# Patient Record
Sex: MALE | Race: WHITE | NOT HISPANIC OR LATINO | Employment: FULL TIME | URBAN - METROPOLITAN AREA
[De-identification: names, ages, dates, MRNs, and addresses within clinical notes are randomized per-mention and may not be internally consistent; named-entity substitution may affect disease eponyms.]

---

## 2024-01-18 DIAGNOSIS — M25.511 ACUTE PAIN OF RIGHT SHOULDER: Primary | ICD-10-CM

## 2024-02-19 RX ORDER — ATORVASTATIN CALCIUM 20 MG/1
TABLET, FILM COATED ORAL EVERY 24 HOURS
COMMUNITY

## 2024-02-19 RX ORDER — BUPROPION HYDROCHLORIDE 150 MG/1
150 TABLET ORAL EVERY 24 HOURS
COMMUNITY

## 2024-02-19 RX ORDER — LISINOPRIL 10 MG/1
10 TABLET ORAL EVERY 24 HOURS
COMMUNITY

## 2024-02-19 RX ORDER — OMEPRAZOLE 40 MG/1
1 CAPSULE, DELAYED RELEASE ORAL EVERY 24 HOURS
COMMUNITY

## 2024-02-20 ENCOUNTER — OFFICE VISIT (OUTPATIENT)
Dept: OBGYN CLINIC | Facility: CLINIC | Age: 54
End: 2024-02-20
Payer: COMMERCIAL

## 2024-02-20 ENCOUNTER — APPOINTMENT (OUTPATIENT)
Dept: RADIOLOGY | Facility: CLINIC | Age: 54
End: 2024-02-20
Payer: COMMERCIAL

## 2024-02-20 VITALS
HEIGHT: 68 IN | RESPIRATION RATE: 17 BRPM | DIASTOLIC BLOOD PRESSURE: 95 MMHG | BODY MASS INDEX: 32.95 KG/M2 | WEIGHT: 217.4 LBS | HEART RATE: 76 BPM | SYSTOLIC BLOOD PRESSURE: 153 MMHG

## 2024-02-20 DIAGNOSIS — M25.511 RIGHT SHOULDER PAIN, UNSPECIFIED CHRONICITY: ICD-10-CM

## 2024-02-20 DIAGNOSIS — M25.511 RIGHT SHOULDER PAIN, UNSPECIFIED CHRONICITY: Primary | ICD-10-CM

## 2024-02-20 PROCEDURE — 99203 OFFICE O/P NEW LOW 30 MIN: CPT | Performed by: STUDENT IN AN ORGANIZED HEALTH CARE EDUCATION/TRAINING PROGRAM

## 2024-02-20 PROCEDURE — 73030 X-RAY EXAM OF SHOULDER: CPT

## 2024-02-20 PROCEDURE — 73080 X-RAY EXAM OF ELBOW: CPT

## 2024-03-12 ENCOUNTER — TRANSCRIBE ORDERS (OUTPATIENT)
Dept: SCHEDULING | Age: 54
End: 2024-03-12

## 2024-03-12 DIAGNOSIS — Z96.611 PRESENCE OF RIGHT ARTIFICIAL SHOULDER JOINT: ICD-10-CM

## 2024-03-12 DIAGNOSIS — T84.84XA PAIN DUE TO INTERNAL ORTHOPEDIC PROSTHETIC DEVICES, IMPLANTS AND GRAFTS, INITIAL ENCOUNTER (CMS/HCC): Primary | ICD-10-CM

## 2024-04-04 ENCOUNTER — HOSPITAL ENCOUNTER (OUTPATIENT)
Dept: RADIOLOGY | Facility: HOSPITAL | Age: 54
Discharge: HOME | End: 2024-04-04
Attending: ORTHOPAEDIC SURGERY
Payer: COMMERCIAL

## 2024-04-04 DIAGNOSIS — T84.84XA PAIN DUE TO INTERNAL ORTHOPEDIC PROSTHETIC DEVICES, IMPLANTS AND GRAFTS, INITIAL ENCOUNTER (CMS/HCC): ICD-10-CM

## 2024-04-04 DIAGNOSIS — Z96.611 PRESENCE OF RIGHT ARTIFICIAL SHOULDER JOINT: ICD-10-CM

## 2024-04-04 PROCEDURE — 73200 CT UPPER EXTREMITY W/O DYE: CPT | Mod: RT

## 2024-07-23 ENCOUNTER — PRE-ADMISSION TESTING (OUTPATIENT)
Dept: PREADMISSION TESTING | Age: 54
End: 2024-07-23
Payer: COMMERCIAL

## 2024-07-23 VITALS — HEIGHT: 68 IN | WEIGHT: 215 LBS | BODY MASS INDEX: 32.58 KG/M2

## 2024-07-23 RX ORDER — IBUPROFEN 400 MG/1
400 TABLET ORAL EVERY 6 HOURS PRN
COMMUNITY
End: 2024-08-01 | Stop reason: HOSPADM

## 2024-07-23 RX ORDER — LISINOPRIL 10 MG/1
10 TABLET ORAL DAILY
COMMUNITY

## 2024-07-23 RX ORDER — ATORVASTATIN CALCIUM 20 MG/1
20 TABLET, FILM COATED ORAL DAILY
COMMUNITY

## 2024-07-23 RX ORDER — BUPROPION HYDROCHLORIDE 150 MG/1
150 TABLET ORAL DAILY
COMMUNITY

## 2024-07-23 RX ORDER — OMEPRAZOLE 40 MG/1
40 CAPSULE, DELAYED RELEASE ORAL DAILY
COMMUNITY

## 2024-07-23 ASSESSMENT — PAIN SCALES - GENERAL: PAINLEVEL: 3

## 2024-07-23 NOTE — PRE-PROCEDURE INSTRUCTIONS
[facility]  [address] [place] [address]    1.       Admissions will call you with your arrival time on  July 30, 2024 (day prior to surgery) between 2pm-4pm.  For questions about your arrival time, please call [phone].    2.       On the day of your procedure please report to the [facility] in the [place]. Please arrive through the [facility] Entrance.  If you are parking in the [address] [facility], come to the ground floor of the garage and follow signs to the [facility].  After being screened, please report to either the Outpatient Registration for Pre-Admission Testing or to the Surgery Registration Desk.    3. Please follow the following fasting guidelines:     No solid food EIGHT HOURS prior to arrival time on day of surgery.  6 ounces of clear liquids, meaning water or PLAIN black coffee WITHOUT any milk, cream, sugar, or sweetener are permitted up to TWO HOURS prior to arrival at the hospital.    4. Please take ONLY the following medications with a sip of water on the morning of your procedure: (populate names and/or NONE)  Lipitor, Wellbutrin, Prilosec.    No NSAIDS, Aspirin, Advil, Aleve, Motrin, Ibuprofen, Herbal Supplements or Vitamins until after surgery. Tylenol is ok to take.        5. Other Instructions: You may brush your teeth the morning of the procedure. Rinse and spit, do not swallow.  Bring a list of your medications with dosages.  Use surgical wash as directed.   [facility]  Patient Education Preoperative Showers    Good hygiene, such as frequent handwashing and daily skin cleansing, promotes good health. Daily skin cleansing helps remove germs that may cause infections. The following instructions should be followed to help reduce germs on your skin prior to your surgical procedure.     Bactoshield/Hibiclens CHG 4% is an antiseptic soap. The active ingredient is chlorhexidine gluconate. Do not use this product if you are allergic to  chlorhexidine gluconate.  The NIGHT before and the MORNING of your procedure, shower or bathe with Bactoshield. This product should replace your regular soap used for cleansing most of your body surfaces. Bactoshield should not be used on your head or face; keep out of your eyes, ears and mouth.  If you plan to wash your hair, do so with your regular shampoo. Then, rinse the hair and your body thoroughly to remove any shampoo residue.  Wash your face with regular soap and water only.  Wash your genital area with soap and water only.  Thoroughly rinse your body with warm water from the neck down.  Apply the minimum amount of Bactoshield to cover the skin. Use this product as you would any liquid soap. Leave this on for 2 minutes. NOTE- Bactoshield DOES NOT LATHER like normal soap.   Wash the skin gently and rinse thoroughly with warm water. You do not need to scrub the skin to remove germs.  Do not use regular soap after you have applied and rinsed the Bactoshield.  Change into clean clothes after each shower.   Do not apply any lotions, powders, or perfumes to the body areas that have been cleansed with Bactoshield.  No use of hair removal products or shaving at or near the surgical site 48 hours before your procedure. (72 hours for cardiac patients.)  For those having perineal surgeries (i.e.: vaginal, rectal or cystoscopy) - please use Dial soap.  6. If you develop a cold, cough, fever, rash, or other symptom prior to the day of the procedure, please report it to your physician immediately.    7. If you need to cancel the procedure for any reason, please contact your physician.    8. Make arrangements to have safe transportation home accompanied by a responsible adult. If you have not arranged safe transportation home, your surgery will be cancelled. Safe transportation may include private vehicle, ride-share service, taxi and public transportation when accompanied by a responsible adult who will assist you home.  A responsible adult is someone known to you and does not include the taxi, ride-share or public transit drive transporting you.    9.  If it is medically necessary for you to have a longer stay, you will be informed as soon as the decision is made.    10. Only bring essential items to the hospital.  Do not wear or bring anything of value to the hospital including jewelry of any kind, money, or wallet. Do not wear make-up or contact lenses.  DO NOT BRING MEDICATIONS FROM HOME unless instructed to do so. DO bring your hearing aids, glasses, and a case    11. No lotion, creams, powders, or oils on skin the morning of procedure     12. Dress in comfortable clothes.    13.  If instructed, please bring a copy of your Advanced Directive (Living Will/Durable Power of ) on the day of your procedure.     14. Ensuring your safety at all times is a very important part of our [facility] Culture of Safety. After having surgery and sedation, you are at risk for falling and balance issues. Although you may feel awake, the effects of the medication can last up to 24 hours after anesthesia. If you need to use the bathroom during your recovery period, nursing staff will escort you there and stay with you to ensure your safety.    15. Refrain from drinking alcohol and smoking cigarettes for 24 hours prior to surgery.    16. Shower with antibacterial soap (Dial) the night before and morning of your procedure.  If your procedure indicates the need for CHG antiseptic wash (Bactoshield or Hibiclens), please use this instead and follow instructions as discussed at the time of your Pre-Admission Testing phone interview or visit.    Above instructions reviewed with patient and patient acknowledges understanding.    Form explained by: Suzi Alvarez RN

## 2024-07-25 ENCOUNTER — TRANSCRIBE ORDERS (OUTPATIENT)
Dept: PREADMISSION TESTING | Facility: HOSPITAL | Age: 54
End: 2024-07-25

## 2024-07-25 DIAGNOSIS — T84.84XD PAIN DUE TO INTERNAL ORTHOPEDIC PROSTHETIC DEVICES, IMPLANTS AND GRAFTS, SUBSEQUENT ENCOUNTER: Primary | ICD-10-CM

## 2024-07-26 ENCOUNTER — HOSPITAL ENCOUNTER (OUTPATIENT)
Dept: CARDIOLOGY | Facility: HOSPITAL | Age: 54
Discharge: HOME | End: 2024-07-26
Attending: ORTHOPAEDIC SURGERY
Payer: COMMERCIAL

## 2024-07-26 ENCOUNTER — PRE-ADMISSION TESTING (OUTPATIENT)
Dept: PREADMISSION TESTING | Facility: HOSPITAL | Age: 54
End: 2024-07-26
Attending: ORTHOPAEDIC SURGERY
Payer: COMMERCIAL

## 2024-07-26 VITALS
HEART RATE: 62 BPM | HEIGHT: 68 IN | TEMPERATURE: 97 F | BODY MASS INDEX: 31.8 KG/M2 | DIASTOLIC BLOOD PRESSURE: 88 MMHG | WEIGHT: 209.8 LBS | SYSTOLIC BLOOD PRESSURE: 126 MMHG | OXYGEN SATURATION: 100 % | RESPIRATION RATE: 16 BRPM

## 2024-07-26 DIAGNOSIS — E78.9 LIPID DISORDER: ICD-10-CM

## 2024-07-26 DIAGNOSIS — T84.84XD PAIN DUE TO INTERNAL ORTHOPEDIC PROSTHETIC DEVICES, IMPLANTS AND GRAFTS, SUBSEQUENT ENCOUNTER: ICD-10-CM

## 2024-07-26 DIAGNOSIS — I10 HYPERTENSION, UNSPECIFIED TYPE: ICD-10-CM

## 2024-07-26 DIAGNOSIS — Z01.818 ENCOUNTER FOR OTHER PREPROCEDURAL EXAMINATION: Primary | ICD-10-CM

## 2024-07-26 DIAGNOSIS — F32.89 OTHER DEPRESSION: ICD-10-CM

## 2024-07-26 PROBLEM — F32.A DEPRESSION: Status: ACTIVE | Noted: 2024-07-26

## 2024-07-26 LAB
ABO + RH BLD: NORMAL
ANION GAP SERPL CALC-SCNC: 8 MEQ/L (ref 3–15)
BLD GP AB SCN SERPL QL: NEGATIVE
BLOOD BANK CMNT PATIENT-IMP: NORMAL
BUN SERPL-MCNC: 9 MG/DL (ref 7–25)
CALCIUM SERPL-MCNC: 9.9 MG/DL (ref 8.6–10.3)
CHLORIDE SERPL-SCNC: 101 MEQ/L (ref 98–107)
CO2 SERPL-SCNC: 26 MEQ/L (ref 21–31)
CREAT SERPL-MCNC: 1.2 MG/DL (ref 0.7–1.3)
CRP SERPL-MCNC: <1 MG/L
D AG BLD QL: POSITIVE
EGFRCR SERPLBLD CKD-EPI 2021: >60 ML/MIN/1.73M*2
ERYTHROCYTE [DISTWIDTH] IN BLOOD BY AUTOMATED COUNT: 12.5 % (ref 11.6–14.4)
ERYTHROCYTE [SEDIMENTATION RATE] IN BLOOD BY WESTERGREN METHOD: 6 MM/HR
GLUCOSE SERPL-MCNC: 98 MG/DL (ref 70–99)
HCT VFR BLD AUTO: 43.3 % (ref 40.1–51)
HGB BLD-MCNC: 14.8 G/DL (ref 13.7–17.5)
LABORATORY COMMENT REPORT: NORMAL
MCH RBC QN AUTO: 30 PG (ref 28–33.2)
MCHC RBC AUTO-ENTMCNC: 34.2 G/DL (ref 32.2–36.5)
MCV RBC AUTO: 87.7 FL (ref 83–98)
PDW BLD AUTO: 10.7 FL (ref 9.4–12.4)
PLATELET # BLD AUTO: 187 K/UL (ref 150–350)
POTASSIUM SERPL-SCNC: 4.5 MEQ/L (ref 3.5–5.1)
RBC # BLD AUTO: 4.94 M/UL (ref 4.5–5.8)
SODIUM SERPL-SCNC: 135 MEQ/L (ref 136–145)
SPECIMEN EXP DATE BLD: NORMAL
WBC # BLD AUTO: 4.87 K/UL (ref 3.8–10.5)

## 2024-07-26 PROCEDURE — 85652 RBC SED RATE AUTOMATED: CPT

## 2024-07-26 PROCEDURE — 99204 OFFICE O/P NEW MOD 45 MIN: CPT | Performed by: HOSPITALIST

## 2024-07-26 PROCEDURE — 80048 BASIC METABOLIC PNL TOTAL CA: CPT

## 2024-07-26 PROCEDURE — 86140 C-REACTIVE PROTEIN: CPT

## 2024-07-26 PROCEDURE — 85027 COMPLETE CBC AUTOMATED: CPT

## 2024-07-26 PROCEDURE — 86850 RBC ANTIBODY SCREEN: CPT

## 2024-07-26 PROCEDURE — 93005 ELECTROCARDIOGRAM TRACING: CPT

## 2024-07-26 PROCEDURE — 36415 COLL VENOUS BLD VENIPUNCTURE: CPT

## 2024-07-26 NOTE — H&P (VIEW-ONLY)
[facility] Medicine -  Pre-Operative Consultation       Patient Name: Ephraim Roberts  Referring Surgeon: dr. mendoza    Reason for Referral: Pre-Operative Evaluation  Surgical Procedure: Right Revision to Reverse Total Shoulder Arthroplasty   Operative Date: 7/31/24  Other Providers:      PCP: Lo Francisco CRNP        HISTORY OF PRESENT ILLNESS      Ephraim Roberts is a 54 y.o. male presenting today to the [facility] at [facility] for pre-operative evaluation prior to planned surgery.  Pt is scheduled for Right Revision to Reverse Total Shoulder Arthroplasty     The patient denies any current or recent chest pain or pressure, dyspnea, cough, sputum, fevers, chills, abdominal pain, nausea, vomiting, diarrhea, urinary complaints, dizzy, lightheaded, blood in stool or other symptoms.     Functionally, the patient is able to ascend a flight or so of stairs with no dyspnea or chest pain.     No history of MI, arrhythmia,or CHF.  No history of CARRIE.  No history of DVT/PE.  No history of COPD.  No history of CVA.  No history of DM.   No history of CKD.     PAST MEDICAL AND SURGICAL HISTORY      Past Medical History:   Diagnosis Date    Arthritis     Depression     GERD (gastroesophageal reflux disease)     Hypertension     Lipid disorder        Past Surgical History:   Procedure Laterality Date    COLONOSCOPY      KNEE ARTHROSCOPY Right 1994    SHOULDER ARTHROTOMY Right 1990    Calcium deposit on Growthplate    SHOULDER SURGERY Right 1991    SHOULDER SURGERY Right 1993    arthritis on clavicle    SHOULDER SURGERY Right 1998    open release Subscapula    SHOULDER SURGERY Right 2006    Partial shoulder replacement/ Dr Aviles    SHOULDER SURGERY Right 2007    Total Shoulder replacement/ Dr Mendoza    WISDOM TOOTH EXTRACTION      WRIST SURGERY Right 05/11/2017    wrist arthroscopy with ulner shortening osteotomy       MEDICATIONS        Current Outpatient Medications:      "atorvastatin (LIPITOR) 20 mg tablet, Take 20 mg by mouth daily., Disp: , Rfl:     buPROPion XL (WELLBUTRIN XL) 150 mg 24 hr tablet, Take 150 mg by mouth daily., Disp: , Rfl:     ibuprofen (MOTRIN) 400 mg tablet, Take 400 mg by mouth every 6 (six) hours as needed., Disp: , Rfl:     lisinopriL (PRINIVIL) 10 mg tablet, Take 10 mg by mouth daily., Disp: , Rfl:     omeprazole (PriLOSEC) 40 mg capsule, Take 40 mg by mouth daily., Disp: , Rfl:     ALLERGIES      Patient has no known allergies.    FAMILY HISTORY      family history includes Arthritis in his biological mother; Heart disease in his biological father.      SOCIAL HISTORY      Social History     Tobacco Use    Smoking status: Former     Packs/day: 1.00     Years: 30.00     Additional pack years: 0.00     Total pack years: 30.00     Types: Cigarettes     Quit date:      Years since quittin.5    Smokeless tobacco: Never   Vaping Use    Vaping Use: Every day    Substances: Nicotine    Devices: [employer] tank   Substance Use Topics    Alcohol use: Yes     Comment: socially    Drug use: Never       REVIEW OF SYSTEMS      All other systems reviewed and negative except as noted in HPI    PHYSICAL EXAMINATION      Visit Vitals  /88 (BP Location: Right upper arm, Patient Position: Sitting)   Pulse 62   Temp 36.1 °C (97 °F) (Temporal)   Resp 16   Ht 1.727 m (5' 8\")   Wt 95.2 kg (209 lb 12.8 oz)   SpO2 100%   BMI 31.90 kg/m²     Body mass index is 31.9 kg/m².    Physical Exam  Constitutional:       Appearance: He is not ill-appearing, toxic-appearing or diaphoretic.   Eyes:      General: No scleral icterus.     Extraocular Movements: Extraocular movements intact.   Cardiovascular:      Rate and Rhythm: Regular rhythm.      Heart sounds:      No friction rub. No gallop.   Pulmonary:      Effort: No respiratory distress.      Breath sounds: No stridor. No wheezing, rhonchi or rales.   Abdominal:      General: Bowel sounds are normal.      Palpations: Abdomen " is soft.   Skin:     General: Skin is warm.   Neurological:      Mental Status: He is alert and oriented to person, place, and time.   Psychiatric:         Mood and Affect: Mood normal.         Thought Content: Thought content normal.         Judgment: Judgment normal.         LABS / EKG        Labs  Results from last 7 days   Lab Units 07/26/24  0831   WBC K/uL 4.87   HEMOGLOBIN g/dL 14.8   HEMATOCRIT % 43.3   PLATELETS K/uL 187     Results from last 7 days   Lab Units 07/26/24  0831   SODIUM mEQ/L 135*   POTASSIUM mEQ/L 4.5   CHLORIDE mEQ/L 101   CO2 mEQ/L 26   BUN mg/dL 9   CREATININE mg/dL 1.2   CALCIUM mg/dL 9.9   GLUCOSE mg/dL 98       IMAGING  No results found.    EKG   EKG independently reviewed - sr w hr of 63. No st elevations.     ASSESSMENT AND PLAN         Encounter for other preprocedural examination  Pt sched for Right Revision to Reverse Total Shoulder Arthroplasty   Pt has no cardiac symptoms and ekg is nonischemic. Pt exhibits no signs/symptoms of angina, arrythmia or decompenstated CHF.   Pt's labs are at an acceptable level to proceed w surgery  Pt is an intermediate but acceptable risk for intended procedure.       Lipid disorder  Cont w statin.     Depression  Cont w wellbutrin    HTN (hypertension)  Hold lisinopril on the morning of surgery       In regards to perioperative cardiac risk:  The patient denies any history of ischemic heart disease, denies any history of CHF, denies any history of CVA, is not on pre-operative treatment with insulin, and does not have a pre-operative creatinine > 2 mg/dL.   Pt's revised cardiac risk index  (RCRI) is 0.4% which equates to low risk for cardiac death, nonfatal myocardial infarction, and nonfatal cardiac arrest     Further comments:  Resume supplements when OK with surgical team.  I would encourage incentive spirometry to assist with minimizing lin-operative pulmonary risk.  DVT prophylaxis and timing of such per the discretion of the surgeon.      Please do not hesitate to contact [facility] during the upcoming hospitalization with any questions or concerns.     Juanpablo Caal DO  7/26/2024

## 2024-07-26 NOTE — ASSESSMENT & PLAN NOTE
Pt sched for Right Revision to Reverse Total Shoulder Arthroplasty   Pt has no cardiac symptoms and ekg is nonischemic. Pt exhibits no signs/symptoms of angina, arrythmia or decompenstated CHF.   Pt's labs are at an acceptable level to proceed w surgery  Pt is an intermediate but acceptable risk for intended procedure.

## 2024-07-26 NOTE — CONSULTS
[facility] Medicine -  Pre-Operative Consultation       Patient Name: Ephraim Roberts  Referring Surgeon: dr. mendoza    Reason for Referral: Pre-Operative Evaluation  Surgical Procedure: Right Revision to Reverse Total Shoulder Arthroplasty   Operative Date: 7/31/24  Other Providers:      PCP: Lo Francisco CRNP        HISTORY OF PRESENT ILLNESS      Ephraim Roberts is a 54 y.o. male presenting today to the [facility] at [facility] for pre-operative evaluation prior to planned surgery.  Pt is scheduled for Right Revision to Reverse Total Shoulder Arthroplasty     The patient denies any current or recent chest pain or pressure, dyspnea, cough, sputum, fevers, chills, abdominal pain, nausea, vomiting, diarrhea, urinary complaints, dizzy, lightheaded, blood in stool or other symptoms.     Functionally, the patient is able to ascend a flight or so of stairs with no dyspnea or chest pain.     No history of MI, arrhythmia,or CHF.  No history of CARRIE.  No history of DVT/PE.  No history of COPD.  No history of CVA.  No history of DM.   No history of CKD.     PAST MEDICAL AND SURGICAL HISTORY      Past Medical History:   Diagnosis Date    Arthritis     Depression     GERD (gastroesophageal reflux disease)     Hypertension     Lipid disorder        Past Surgical History:   Procedure Laterality Date    COLONOSCOPY      KNEE ARTHROSCOPY Right 1994    SHOULDER ARTHROTOMY Right 1990    Calcium deposit on Growthplate    SHOULDER SURGERY Right 1991    SHOULDER SURGERY Right 1993    arthritis on clavicle    SHOULDER SURGERY Right 1998    open release Subscapula    SHOULDER SURGERY Right 2006    Partial shoulder replacement/ Dr Aviles    SHOULDER SURGERY Right 2007    Total Shoulder replacement/ Dr Mendoza    WISDOM TOOTH EXTRACTION      WRIST SURGERY Right 05/11/2017    wrist arthroscopy with ulner shortening osteotomy       MEDICATIONS        Current Outpatient Medications:      "atorvastatin (LIPITOR) 20 mg tablet, Take 20 mg by mouth daily., Disp: , Rfl:     buPROPion XL (WELLBUTRIN XL) 150 mg 24 hr tablet, Take 150 mg by mouth daily., Disp: , Rfl:     ibuprofen (MOTRIN) 400 mg tablet, Take 400 mg by mouth every 6 (six) hours as needed., Disp: , Rfl:     lisinopriL (PRINIVIL) 10 mg tablet, Take 10 mg by mouth daily., Disp: , Rfl:     omeprazole (PriLOSEC) 40 mg capsule, Take 40 mg by mouth daily., Disp: , Rfl:     ALLERGIES      Patient has no known allergies.    FAMILY HISTORY      family history includes Arthritis in his biological mother; Heart disease in his biological father.      SOCIAL HISTORY      Social History     Tobacco Use    Smoking status: Former     Packs/day: 1.00     Years: 30.00     Additional pack years: 0.00     Total pack years: 30.00     Types: Cigarettes     Quit date:      Years since quittin.5    Smokeless tobacco: Never   Vaping Use    Vaping Use: Every day    Substances: Nicotine    Devices: [employer] tank   Substance Use Topics    Alcohol use: Yes     Comment: socially    Drug use: Never       REVIEW OF SYSTEMS      All other systems reviewed and negative except as noted in HPI    PHYSICAL EXAMINATION      Visit Vitals  /88 (BP Location: Right upper arm, Patient Position: Sitting)   Pulse 62   Temp 36.1 °C (97 °F) (Temporal)   Resp 16   Ht 1.727 m (5' 8\")   Wt 95.2 kg (209 lb 12.8 oz)   SpO2 100%   BMI 31.90 kg/m²     Body mass index is 31.9 kg/m².    Physical Exam  Constitutional:       Appearance: He is not ill-appearing, toxic-appearing or diaphoretic.   Eyes:      General: No scleral icterus.     Extraocular Movements: Extraocular movements intact.   Cardiovascular:      Rate and Rhythm: Regular rhythm.      Heart sounds:      No friction rub. No gallop.   Pulmonary:      Effort: No respiratory distress.      Breath sounds: No stridor. No wheezing, rhonchi or rales.   Abdominal:      General: Bowel sounds are normal.      Palpations: Abdomen " is soft.   Skin:     General: Skin is warm.   Neurological:      Mental Status: He is alert and oriented to person, place, and time.   Psychiatric:         Mood and Affect: Mood normal.         Thought Content: Thought content normal.         Judgment: Judgment normal.         LABS / EKG        Labs  Results from last 7 days   Lab Units 07/26/24  0831   WBC K/uL 4.87   HEMOGLOBIN g/dL 14.8   HEMATOCRIT % 43.3   PLATELETS K/uL 187     Results from last 7 days   Lab Units 07/26/24  0831   SODIUM mEQ/L 135*   POTASSIUM mEQ/L 4.5   CHLORIDE mEQ/L 101   CO2 mEQ/L 26   BUN mg/dL 9   CREATININE mg/dL 1.2   CALCIUM mg/dL 9.9   GLUCOSE mg/dL 98       IMAGING  No results found.    EKG   EKG independently reviewed - sr w hr of 63. No st elevations.     ASSESSMENT AND PLAN         Encounter for other preprocedural examination  Pt sched for Right Revision to Reverse Total Shoulder Arthroplasty   Pt has no cardiac symptoms and ekg is nonischemic. Pt exhibits no signs/symptoms of angina, arrythmia or decompenstated CHF.   Pt's labs are at an acceptable level to proceed w surgery  Pt is an intermediate but acceptable risk for intended procedure.       Lipid disorder  Cont w statin.     Depression  Cont w wellbutrin    HTN (hypertension)  Hold lisinopril on the morning of surgery       In regards to perioperative cardiac risk:  The patient denies any history of ischemic heart disease, denies any history of CHF, denies any history of CVA, is not on pre-operative treatment with insulin, and does not have a pre-operative creatinine > 2 mg/dL.   Pt's revised cardiac risk index  (RCRI) is 0.4% which equates to low risk for cardiac death, nonfatal myocardial infarction, and nonfatal cardiac arrest     Further comments:  Resume supplements when OK with surgical team.  I would encourage incentive spirometry to assist with minimizing lin-operative pulmonary risk.  DVT prophylaxis and timing of such per the discretion of the surgeon.      Please do not hesitate to contact [facility] during the upcoming hospitalization with any questions or concerns.     Juanpablo Caal DO  7/26/2024

## 2024-07-27 LAB
ATRIAL RATE: 63
P AXIS: 25
PR INTERVAL: 158
QRS DURATION: 82
QT INTERVAL: 404
QTC CALCULATION(BAZETT): 413
R AXIS: 41
T WAVE AXIS: 40
VENTRICULAR RATE: 63

## 2024-07-29 NOTE — DISCHARGE INSTRUCTIONS
Please take Doxycycline 100mg twice daily for 2 weeks until the cultures taken in the operating room are finalized. Use caution when out in the sun as doxycycline makes you more sensitive to the sun. Use sunscreen.        Procedure name: Reverse Ball and Socket replacement - Reji     Symptoms to call for instructions  The following are CONCERNING SYMPTOMS after a reverse shoulder replacement    PLEASE CALL YOUR DOCTOR IF:                * You have excessive redness of the incision.              * You have drainage for more than 4 days.              * You have a fever greater than 101.5 degrees Fahrenheit.    Activity restrictions instructions  The following are ACTIVITY RESTRICTIONS after a reverse shoulder replacement    * A sling has been provided for you.  * After the first 48 to 72 hours, you may remove the sling to bathe and dress and perform mirian ctivities taught to you prior to discharge. Otherwise, the sling should be worn.    Wound care instructions  The following are INCISION CARE instructions after a reverse shoulder replacement           * Use ice on the shoulder intermittently over the first 48 hours after surgery                    (20 minutes on and 20 minutes off).  If you have a beige tape dressing, you may remove it after 2 days.  If you have a clear plastic dressing, it is waterproof and should not be removed  * You may shower immediately with the waterproof dressing. Otherwise after the 5th day.  The incision site can get wet after day 5 but CANNOT be submerged under water until your sutures/staples are removed.  * Do not rub the incision.  Make sure your axilla (armpit) is completely dry after showering.    Additional supplement instructions  In addition,    * Pain medication has been prescribed for you.  Take a stool softener (Colace, Dulcolax or Senakot) if you are using narcotic pain medications.  * Use your medication liberally as directed over the first 48 hours, and then begin to taper  the use of your narcotic.  You may take Extra Strength Tylenol or Tylenol in addition to the narcotic pills early in the postoperative period and in place of them while titrating off of the narcotics.  * DO NOT take ANY nonsteroidal anti-inflammatory pain medications: Advil, Motrin, Ibuprofen, Aleve, Naproxen, or Naprosyn.      * Take one 81mg aspirin once a day for 6 weeks after surgery, unless you have an aspirin sensitivity /allergy or asthma.      * Please call [phone] or [phone] for any problems.  * Please call [phone] to make a follow-up appointment.  You should see the doctor 10-14 days after your surgery.

## 2024-07-31 ENCOUNTER — ANESTHESIA EVENT (OUTPATIENT)
Dept: OPERATING ROOM | Facility: HOSPITAL | Age: 54
Setting detail: HOSPITAL OUTPATIENT SURGERY
End: 2024-07-31
Payer: COMMERCIAL

## 2024-07-31 ENCOUNTER — APPOINTMENT (OUTPATIENT)
Dept: RADIOLOGY | Facility: HOSPITAL | Age: 54
End: 2024-07-31
Attending: NURSE PRACTITIONER
Payer: COMMERCIAL

## 2024-07-31 ENCOUNTER — HOSPITAL ENCOUNTER (OUTPATIENT)
Facility: HOSPITAL | Age: 54
Discharge: HOME | End: 2024-08-01
Attending: ORTHOPAEDIC SURGERY | Admitting: ORTHOPAEDIC SURGERY
Payer: COMMERCIAL

## 2024-07-31 DIAGNOSIS — T84.84XD PAIN DUE TO INTERNAL ORTHOPEDIC PROSTHETIC DEVICES, IMPLANTS AND GRAFTS, SUBSEQUENT ENCOUNTER: ICD-10-CM

## 2024-07-31 DIAGNOSIS — Z98.890 S/P SHOULDER SURGERY: Primary | ICD-10-CM

## 2024-07-31 LAB
ABO + RH BLD: NORMAL
APPEARANCE SNV: ABNORMAL
BODY FLD TYPE: ABNORMAL
COLOR SNV: ABNORMAL
D AG BLD QL: POSITIVE
GLUCOSE BLD-MCNC: 108 MG/DL (ref 70–99)
LABORATORY COMMENT REPORT: NORMAL
LYMPHOCYTES NFR FLD MANUAL: 24 %
MONOS+MACROS NFR FLD MANUAL: 1 %
NEUTROPHILS NFR FLD MANUAL: 75 %
POCT TEST: ABNORMAL
RBC # SNV: ABNORMAL CELLS/CU MM (ref 0–10000)
SPECIMEN SOURCE: ABNORMAL
WBC # SNV AUTO: 1486 CELLS/CU MM (ref 0–200)

## 2024-07-31 PROCEDURE — 63600000 HC DRUGS/DETAIL CODE: Mod: JZ | Performed by: STUDENT IN AN ORGANIZED HEALTH CARE EDUCATION/TRAINING PROGRAM

## 2024-07-31 PROCEDURE — 25800000 HC PHARMACY IV SOLUTIONS: Performed by: NURSE ANESTHETIST, CERTIFIED REGISTERED

## 2024-07-31 PROCEDURE — 63700000 HC SELF-ADMINISTRABLE DRUG: Performed by: NURSE PRACTITIONER

## 2024-07-31 PROCEDURE — 87102 FUNGUS ISOLATION CULTURE: CPT | Performed by: ORTHOPAEDIC SURGERY

## 2024-07-31 PROCEDURE — 0RRJ0JZ REPLACEMENT OF RIGHT SHOULDER JOINT WITH SYNTHETIC SUBSTITUTE, OPEN APPROACH: ICD-10-PCS | Performed by: ORTHOPAEDIC SURGERY

## 2024-07-31 PROCEDURE — 71000001 HC PACU PHASE 1 INITIAL 30MIN: Performed by: ORTHOPAEDIC SURGERY

## 2024-07-31 PROCEDURE — 87206 SMEAR FLUORESCENT/ACID STAI: CPT | Performed by: ORTHOPAEDIC SURGERY

## 2024-07-31 PROCEDURE — 89060 EXAM SYNOVIAL FLUID CRYSTALS: CPT | Performed by: ORTHOPAEDIC SURGERY

## 2024-07-31 PROCEDURE — 25800000 HC PHARMACY IV SOLUTIONS: Performed by: NURSE PRACTITIONER

## 2024-07-31 PROCEDURE — 99232 SBSQ HOSP IP/OBS MODERATE 35: CPT | Performed by: HOSPITALIST

## 2024-07-31 PROCEDURE — 36000015 HC OR LEVEL 5 EA ADDL MIN: Performed by: ORTHOPAEDIC SURGERY

## 2024-07-31 PROCEDURE — 89050 BODY FLUID CELL COUNT: CPT | Performed by: ORTHOPAEDIC SURGERY

## 2024-07-31 PROCEDURE — 93005 ELECTROCARDIOGRAM TRACING: CPT | Performed by: STUDENT IN AN ORGANIZED HEALTH CARE EDUCATION/TRAINING PROGRAM

## 2024-07-31 PROCEDURE — 0RPJ0JZ REMOVAL OF SYNTHETIC SUBSTITUTE FROM RIGHT SHOULDER JOINT, OPEN APPROACH: ICD-10-PCS | Performed by: ORTHOPAEDIC SURGERY

## 2024-07-31 PROCEDURE — 63600000 HC DRUGS/DETAIL CODE: Performed by: NURSE ANESTHETIST, CERTIFIED REGISTERED

## 2024-07-31 PROCEDURE — 37000001 HC ANESTHESIA GENERAL: Performed by: ORTHOPAEDIC SURGERY

## 2024-07-31 PROCEDURE — 63600000 HC DRUGS/DETAIL CODE: Mod: JZ | Performed by: NURSE PRACTITIONER

## 2024-07-31 PROCEDURE — 36000005 HC OR LEVEL 5 INITIAL 30MIN: Performed by: ORTHOPAEDIC SURGERY

## 2024-07-31 PROCEDURE — 87076 CULTURE ANAEROBE IDENT EACH: CPT | Performed by: ORTHOPAEDIC SURGERY

## 2024-07-31 PROCEDURE — 71000011 HC PACU PHASE 1 EA ADDL MIN: Performed by: ORTHOPAEDIC SURGERY

## 2024-07-31 PROCEDURE — C1769 GUIDE WIRE: HCPCS | Performed by: ORTHOPAEDIC SURGERY

## 2024-07-31 PROCEDURE — 63600000 HC DRUGS/DETAIL CODE: Mod: JZ | Performed by: ORTHOPAEDIC SURGERY

## 2024-07-31 PROCEDURE — 27200000 HC STERILE SUPPLY: Performed by: ORTHOPAEDIC SURGERY

## 2024-07-31 PROCEDURE — 25000000 HC PHARMACY GENERAL: Performed by: NURSE ANESTHETIST, CERTIFIED REGISTERED

## 2024-07-31 PROCEDURE — 63600000 HC DRUGS/DETAIL CODE: Mod: JZ,JG | Performed by: NURSE ANESTHETIST, CERTIFIED REGISTERED

## 2024-07-31 PROCEDURE — 73020 X-RAY EXAM OF SHOULDER: CPT | Mod: RT

## 2024-07-31 PROCEDURE — 87205 SMEAR GRAM STAIN: CPT | Performed by: ORTHOPAEDIC SURGERY

## 2024-07-31 PROCEDURE — 25000000 HC PHARMACY GENERAL: Performed by: ORTHOPAEDIC SURGERY

## 2024-07-31 PROCEDURE — 36415 COLL VENOUS BLD VENIPUNCTURE: CPT | Performed by: ORTHOPAEDIC SURGERY

## 2024-07-31 PROCEDURE — C1713 ANCHOR/SCREW BN/BN,TIS/BN: HCPCS | Performed by: ORTHOPAEDIC SURGERY

## 2024-07-31 PROCEDURE — C1776 JOINT DEVICE (IMPLANTABLE): HCPCS | Performed by: ORTHOPAEDIC SURGERY

## 2024-07-31 DEVICE — AEQUALIS PERFORM REVERSED AUGMENT BASEPLATE: Type: IMPLANTABLE DEVICE | Site: SHOULDER | Status: FUNCTIONAL

## 2024-07-31 DEVICE — CEMENT BONE SIMPLEX W/TOBRAMYCIN: Type: IMPLANTABLE DEVICE | Site: SHOULDER | Status: FUNCTIONAL

## 2024-07-31 DEVICE — PERFORM REVERSED CENTRAL SCREW 6.5MM X 25MM: Type: IMPLANTABLE DEVICE | Site: SHOULDER | Status: FUNCTIONAL

## 2024-07-31 DEVICE — HUMERAL STEM 12MM X108 REVERSE: Type: IMPLANTABLE DEVICE | Site: SHOULDER | Status: FUNCTIONAL

## 2024-07-31 DEVICE — HUMERAL SOCKET INSERT SZ 36MM STD RSP: Type: IMPLANTABLE DEVICE | Site: SHOULDER | Status: FUNCTIONAL

## 2024-07-31 DEVICE — PERFORM REVERSED PERIPHERAL SCREW 5.0MM X 26MM: Type: IMPLANTABLE DEVICE | Site: SHOULDER | Status: FUNCTIONAL

## 2024-07-31 DEVICE — SCREW 5.0MMX34MM: Type: IMPLANTABLE DEVICE | Site: SHOULDER | Status: FUNCTIONAL

## 2024-07-31 DEVICE — PERFORM REVERSED PERIPHERAL SCREW 22MM X 5MM: Type: IMPLANTABLE DEVICE | Site: SHOULDER | Status: FUNCTIONAL

## 2024-07-31 DEVICE — CHIPS CANCELLOUS 30CC 1-8MM: Type: IMPLANTABLE DEVICE | Site: SHOULDER | Status: FUNCTIONAL

## 2024-07-31 DEVICE — RESTRICTOR CEMENT 12MM: Type: IMPLANTABLE DEVICE | Site: SHOULDER | Status: FUNCTIONAL

## 2024-07-31 DEVICE — IMPLANTABLE DEVICE: Type: IMPLANTABLE DEVICE | Site: SHOULDER | Status: FUNCTIONAL

## 2024-07-31 RX ORDER — HYDROMORPHONE HYDROCHLORIDE 1 MG/ML
0.5 INJECTION, SOLUTION INTRAMUSCULAR; INTRAVENOUS; SUBCUTANEOUS
Status: DISCONTINUED | OUTPATIENT
Start: 2024-07-31 | End: 2024-07-31 | Stop reason: HOSPADM

## 2024-07-31 RX ORDER — ATORVASTATIN CALCIUM 20 MG/1
20 TABLET, FILM COATED ORAL DAILY
Status: DISCONTINUED | OUTPATIENT
Start: 2024-08-01 | End: 2024-08-01 | Stop reason: HOSPADM

## 2024-07-31 RX ORDER — POLYETHYLENE GLYCOL 3350 17 G/17G
17 POWDER, FOR SOLUTION ORAL DAILY
Status: DISCONTINUED | OUTPATIENT
Start: 2024-07-31 | End: 2024-08-01 | Stop reason: HOSPADM

## 2024-07-31 RX ORDER — ROPIVACAINE HYDROCHLORIDE 5 MG/ML
INJECTION, SOLUTION EPIDURAL; INFILTRATION; PERINEURAL
Status: COMPLETED | OUTPATIENT
Start: 2024-07-31 | End: 2024-07-31

## 2024-07-31 RX ORDER — FENTANYL CITRATE 50 UG/ML
INJECTION, SOLUTION INTRAMUSCULAR; INTRAVENOUS AS NEEDED
Status: DISCONTINUED | OUTPATIENT
Start: 2024-07-31 | End: 2024-07-31 | Stop reason: SURG

## 2024-07-31 RX ORDER — DEXTROSE 50 % IN WATER (D50W) INTRAVENOUS SYRINGE
25 AS NEEDED
Status: DISCONTINUED | OUTPATIENT
Start: 2024-07-31 | End: 2024-07-31 | Stop reason: HOSPADM

## 2024-07-31 RX ORDER — DIPHENHYDRAMINE HCL 25 MG
25 CAPSULE ORAL EVERY 6 HOURS PRN
Status: DISCONTINUED | OUTPATIENT
Start: 2024-07-31 | End: 2024-08-01 | Stop reason: HOSPADM

## 2024-07-31 RX ORDER — NEOSTIGMINE METHYLSULFATE 1 MG/ML
INJECTION INTRAVENOUS AS NEEDED
Status: DISCONTINUED | OUTPATIENT
Start: 2024-07-31 | End: 2024-07-31 | Stop reason: SURG

## 2024-07-31 RX ORDER — MIDAZOLAM HYDROCHLORIDE 2 MG/2ML
INJECTION, SOLUTION INTRAMUSCULAR; INTRAVENOUS AS NEEDED
Status: DISCONTINUED | OUTPATIENT
Start: 2024-07-31 | End: 2024-07-31 | Stop reason: SURG

## 2024-07-31 RX ORDER — NAPROXEN SODIUM 220 MG/1
81 TABLET, FILM COATED ORAL DAILY
Status: DISCONTINUED | OUTPATIENT
Start: 2024-08-01 | End: 2024-08-01 | Stop reason: HOSPADM

## 2024-07-31 RX ORDER — BISACODYL 10 MG/1
10 SUPPOSITORY RECTAL DAILY PRN
Status: DISCONTINUED | OUTPATIENT
Start: 2024-07-31 | End: 2024-08-01 | Stop reason: HOSPADM

## 2024-07-31 RX ORDER — SODIUM CHLORIDE 9 MG/ML
INJECTION, SOLUTION INTRAVENOUS CONTINUOUS PRN
Status: DISCONTINUED | OUTPATIENT
Start: 2024-07-31 | End: 2024-07-31 | Stop reason: SURG

## 2024-07-31 RX ORDER — FENTANYL CITRATE 50 UG/ML
50 INJECTION, SOLUTION INTRAMUSCULAR; INTRAVENOUS EVERY 5 MIN PRN
Status: DISCONTINUED | OUTPATIENT
Start: 2024-07-31 | End: 2024-07-31 | Stop reason: HOSPADM

## 2024-07-31 RX ORDER — ONDANSETRON HYDROCHLORIDE 2 MG/ML
4 INJECTION, SOLUTION INTRAVENOUS EVERY 6 HOURS PRN
Status: DISCONTINUED | OUTPATIENT
Start: 2024-07-31 | End: 2024-08-01 | Stop reason: HOSPADM

## 2024-07-31 RX ORDER — SODIUM CHLORIDE, SODIUM GLUCONATE, SODIUM ACETATE, POTASSIUM CHLORIDE AND MAGNESIUM CHLORIDE 30; 37; 368; 526; 502 MG/100ML; MG/100ML; MG/100ML; MG/100ML; MG/100ML
INJECTION, SOLUTION INTRAVENOUS CONTINUOUS PRN
Status: DISCONTINUED | OUTPATIENT
Start: 2024-07-31 | End: 2024-07-31 | Stop reason: SURG

## 2024-07-31 RX ORDER — SODIUM CHLORIDE 9 MG/ML
INJECTION, SOLUTION INTRAVENOUS CONTINUOUS
Status: DISCONTINUED | OUTPATIENT
Start: 2024-07-31 | End: 2024-08-01 | Stop reason: HOSPADM

## 2024-07-31 RX ORDER — DOXYCYCLINE HYCLATE 100 MG
100 TABLET ORAL EVERY 12 HOURS
Status: DISCONTINUED | OUTPATIENT
Start: 2024-08-01 | End: 2024-08-01 | Stop reason: HOSPADM

## 2024-07-31 RX ORDER — ONDANSETRON HYDROCHLORIDE 2 MG/ML
4 INJECTION, SOLUTION INTRAVENOUS
Status: DISCONTINUED | OUTPATIENT
Start: 2024-07-31 | End: 2024-07-31 | Stop reason: HOSPADM

## 2024-07-31 RX ORDER — OXYCODONE HYDROCHLORIDE 5 MG/1
5-10 TABLET ORAL EVERY 4 HOURS PRN
Status: DISCONTINUED | OUTPATIENT
Start: 2024-07-31 | End: 2024-08-01 | Stop reason: HOSPADM

## 2024-07-31 RX ORDER — AMOXICILLIN 250 MG
1 CAPSULE ORAL 2 TIMES DAILY
Status: DISCONTINUED | OUTPATIENT
Start: 2024-07-31 | End: 2024-08-01 | Stop reason: HOSPADM

## 2024-07-31 RX ORDER — CEFAZOLIN SODIUM 2 G/100ML
INJECTION, SOLUTION INTRAVENOUS AS NEEDED
Status: DISCONTINUED | OUTPATIENT
Start: 2024-07-31 | End: 2024-07-31 | Stop reason: SURG

## 2024-07-31 RX ORDER — IBUPROFEN 200 MG
16-32 TABLET ORAL AS NEEDED
Status: DISCONTINUED | OUTPATIENT
Start: 2024-07-31 | End: 2024-07-31 | Stop reason: HOSPADM

## 2024-07-31 RX ORDER — DEXTROSE 40 %
15-30 GEL (GRAM) ORAL AS NEEDED
Status: DISCONTINUED | OUTPATIENT
Start: 2024-07-31 | End: 2024-08-01 | Stop reason: HOSPADM

## 2024-07-31 RX ORDER — ALUMINUM HYDROXIDE, MAGNESIUM HYDROXIDE, AND SIMETHICONE 1200; 120; 1200 MG/30ML; MG/30ML; MG/30ML
30 SUSPENSION ORAL EVERY 4 HOURS PRN
Status: DISCONTINUED | OUTPATIENT
Start: 2024-07-31 | End: 2024-08-01 | Stop reason: HOSPADM

## 2024-07-31 RX ORDER — BUPROPION HYDROCHLORIDE 150 MG/1
150 TABLET ORAL DAILY
Status: DISCONTINUED | OUTPATIENT
Start: 2024-08-01 | End: 2024-08-01 | Stop reason: HOSPADM

## 2024-07-31 RX ORDER — PROPOFOL 10 MG/ML
INJECTION, EMULSION INTRAVENOUS AS NEEDED
Status: DISCONTINUED | OUTPATIENT
Start: 2024-07-31 | End: 2024-07-31 | Stop reason: SURG

## 2024-07-31 RX ORDER — ONDANSETRON HYDROCHLORIDE 2 MG/ML
INJECTION, SOLUTION INTRAVENOUS AS NEEDED
Status: DISCONTINUED | OUTPATIENT
Start: 2024-07-31 | End: 2024-07-31 | Stop reason: SURG

## 2024-07-31 RX ORDER — DEXAMETHASONE SODIUM PHOSPHATE 4 MG/ML
INJECTION, SOLUTION INTRA-ARTICULAR; INTRALESIONAL; INTRAMUSCULAR; INTRAVENOUS; SOFT TISSUE
Status: COMPLETED | OUTPATIENT
Start: 2024-07-31 | End: 2024-07-31

## 2024-07-31 RX ORDER — VANCOMYCIN HYDROCHLORIDE 500 MG/10ML
INJECTION, POWDER, LYOPHILIZED, FOR SOLUTION INTRAVENOUS
Status: DISCONTINUED | OUTPATIENT
Start: 2024-07-31 | End: 2024-07-31 | Stop reason: HOSPADM

## 2024-07-31 RX ORDER — LISINOPRIL 10 MG/1
10 TABLET ORAL DAILY
Status: DISCONTINUED | OUTPATIENT
Start: 2024-08-01 | End: 2024-08-01 | Stop reason: HOSPADM

## 2024-07-31 RX ORDER — IBUPROFEN 200 MG
16-32 TABLET ORAL AS NEEDED
Status: DISCONTINUED | OUTPATIENT
Start: 2024-07-31 | End: 2024-08-01 | Stop reason: HOSPADM

## 2024-07-31 RX ORDER — DEXAMETHASONE SODIUM PHOSPHATE 4 MG/ML
INJECTION, SOLUTION INTRA-ARTICULAR; INTRALESIONAL; INTRAMUSCULAR; INTRAVENOUS; SOFT TISSUE AS NEEDED
Status: DISCONTINUED | OUTPATIENT
Start: 2024-07-31 | End: 2024-07-31 | Stop reason: SURG

## 2024-07-31 RX ORDER — ACETAMINOPHEN 325 MG/1
650 TABLET ORAL EVERY 4 HOURS PRN
Status: DISCONTINUED | OUTPATIENT
Start: 2024-07-31 | End: 2024-08-01 | Stop reason: HOSPADM

## 2024-07-31 RX ORDER — DEXTROSE 50 % IN WATER (D50W) INTRAVENOUS SYRINGE
25 AS NEEDED
Status: DISCONTINUED | OUTPATIENT
Start: 2024-07-31 | End: 2024-08-01 | Stop reason: HOSPADM

## 2024-07-31 RX ORDER — ROCURONIUM BROMIDE 10 MG/ML
INJECTION, SOLUTION INTRAVENOUS AS NEEDED
Status: DISCONTINUED | OUTPATIENT
Start: 2024-07-31 | End: 2024-07-31 | Stop reason: SURG

## 2024-07-31 RX ORDER — DEXTROSE 40 %
15-30 GEL (GRAM) ORAL AS NEEDED
Status: DISCONTINUED | OUTPATIENT
Start: 2024-07-31 | End: 2024-07-31 | Stop reason: HOSPADM

## 2024-07-31 RX ORDER — PANTOPRAZOLE SODIUM 40 MG/1
40 TABLET, DELAYED RELEASE ORAL DAILY
Status: DISCONTINUED | OUTPATIENT
Start: 2024-08-01 | End: 2024-08-01 | Stop reason: HOSPADM

## 2024-07-31 RX ORDER — GLYCOPYRROLATE 0.6MG/3ML
SYRINGE (ML) INTRAVENOUS AS NEEDED
Status: DISCONTINUED | OUTPATIENT
Start: 2024-07-31 | End: 2024-07-31 | Stop reason: SURG

## 2024-07-31 RX ORDER — LIDOCAINE HYDROCHLORIDE 10 MG/ML
INJECTION, SOLUTION INFILTRATION; PERINEURAL AS NEEDED
Status: DISCONTINUED | OUTPATIENT
Start: 2024-07-31 | End: 2024-07-31 | Stop reason: SURG

## 2024-07-31 RX ORDER — PHENYLEPHRINE HYDROCHLORIDE 10 MG/ML
INJECTION INTRAVENOUS AS NEEDED
Status: DISCONTINUED | OUTPATIENT
Start: 2024-07-31 | End: 2024-07-31 | Stop reason: SURG

## 2024-07-31 RX ADMIN — NEOSTIGMINE METHYLSULFATE 3 MG: 1 INJECTION INTRAVENOUS at 16:05

## 2024-07-31 RX ADMIN — PHENYLEPHRINE HYDROCHLORIDE 100 MCG: 10 INJECTION INTRAVENOUS at 15:01

## 2024-07-31 RX ADMIN — CEFAZOLIN SODIUM 2 G: 2 INJECTION, SOLUTION INTRAVENOUS at 14:43

## 2024-07-31 RX ADMIN — SODIUM CHLORIDE, SODIUM GLUCONATE, SODIUM ACETATE, POTASSIUM CHLORIDE AND MAGNESIUM CHLORIDE: 526; 502; 368; 37; 30 INJECTION, SOLUTION INTRAVENOUS at 15:25

## 2024-07-31 RX ADMIN — FENTANYL CITRATE 50 MCG: 50 INJECTION INTRAMUSCULAR; INTRAVENOUS at 16:10

## 2024-07-31 RX ADMIN — DOCUSATE SODIUM AND SENNOSIDES 1 TABLET: 8.6; 5 TABLET, FILM COATED ORAL at 20:34

## 2024-07-31 RX ADMIN — ROCURONIUM BROMIDE 50 MG: 10 INJECTION, SOLUTION INTRAVENOUS at 13:18

## 2024-07-31 RX ADMIN — CEFAZOLIN 2 G: 2 INJECTION, POWDER, FOR SOLUTION INTRAMUSCULAR; INTRAVENOUS at 13:30

## 2024-07-31 RX ADMIN — FENTANYL CITRATE 25 MCG: 50 INJECTION INTRAMUSCULAR; INTRAVENOUS at 13:10

## 2024-07-31 RX ADMIN — GLYCOPYRROLATE 0.4 MG: 0.2 INJECTION, SOLUTION INTRAMUSCULAR; INTRAVENOUS at 16:05

## 2024-07-31 RX ADMIN — PHENYLEPHRINE HYDROCHLORIDE 100 MCG: 10 INJECTION INTRAVENOUS at 14:41

## 2024-07-31 RX ADMIN — ROPIVACAINE HYDROCHLORIDE 30 ML: 5 INJECTION, SOLUTION EPIDURAL; INFILTRATION; PERINEURAL at 13:05

## 2024-07-31 RX ADMIN — DEXAMETHASONE SODIUM PHOSPHATE 4 MG: 4 INJECTION, SOLUTION INTRA-ARTICULAR; INTRALESIONAL; INTRAMUSCULAR; INTRAVENOUS; SOFT TISSUE at 13:05

## 2024-07-31 RX ADMIN — PHENYLEPHRINE HYDROCHLORIDE 100 MCG: 10 INJECTION INTRAVENOUS at 14:45

## 2024-07-31 RX ADMIN — PHENYLEPHRINE HYDROCHLORIDE 100 MCG: 10 INJECTION INTRAVENOUS at 15:03

## 2024-07-31 RX ADMIN — DEXAMETHASONE SODIUM PHOSPHATE 4 MG: 4 INJECTION, SOLUTION INTRA-ARTICULAR; INTRALESIONAL; INTRAMUSCULAR; INTRAVENOUS; SOFT TISSUE at 13:30

## 2024-07-31 RX ADMIN — SODIUM CHLORIDE: 9 INJECTION, SOLUTION INTRAVENOUS at 20:33

## 2024-07-31 RX ADMIN — FENTANYL CITRATE 75 MCG: 50 INJECTION INTRAMUSCULAR; INTRAVENOUS at 13:18

## 2024-07-31 RX ADMIN — FENTANYL CITRATE 25 MCG: 50 INJECTION INTRAMUSCULAR; INTRAVENOUS at 16:17

## 2024-07-31 RX ADMIN — CEFAZOLIN 2 G: 2 INJECTION, POWDER, FOR SOLUTION INTRAMUSCULAR; INTRAVENOUS at 20:40

## 2024-07-31 RX ADMIN — LIDOCAINE HYDROCHLORIDE 5 ML: 10 INJECTION, SOLUTION INFILTRATION; PERINEURAL at 13:18

## 2024-07-31 RX ADMIN — FENTANYL CITRATE 25 MCG: 50 INJECTION INTRAMUSCULAR; INTRAVENOUS at 16:12

## 2024-07-31 RX ADMIN — PHENYLEPHRINE HYDROCHLORIDE 100 MCG: 10 INJECTION INTRAVENOUS at 15:15

## 2024-07-31 RX ADMIN — ONDANSETRON 4 MG: 2 INJECTION INTRAMUSCULAR; INTRAVENOUS at 15:38

## 2024-07-31 RX ADMIN — MIDAZOLAM HYDROCHLORIDE 2 MG: 1 INJECTION, SOLUTION INTRAMUSCULAR; INTRAVENOUS at 13:10

## 2024-07-31 RX ADMIN — POLYETHYLENE GLYCOL 3350 17 G: 17 POWDER, FOR SOLUTION ORAL at 20:34

## 2024-07-31 RX ADMIN — PHENYLEPHRINE HYDROCHLORIDE 150 MCG: 10 INJECTION INTRAVENOUS at 14:17

## 2024-07-31 RX ADMIN — SODIUM CHLORIDE: 0.9 INJECTION, SOLUTION INTRAVENOUS at 13:10

## 2024-07-31 RX ADMIN — PROPOFOL 200 MG: 10 INJECTION, EMULSION INTRAVENOUS at 13:18

## 2024-07-31 ASSESSMENT — COGNITIVE AND FUNCTIONAL STATUS - GENERAL
STANDING UP FROM CHAIR USING ARMS: 4 - NONE
WALKING IN HOSPITAL ROOM: 4 - NONE
MOVING TO AND FROM BED TO CHAIR: 4 - NONE
CLIMB 3 TO 5 STEPS WITH RAILING: 3 - A LITTLE

## 2024-07-31 ASSESSMENT — ENCOUNTER SYMPTOMS: DEPRESSION: 1

## 2024-07-31 NOTE — OR SURGEON
Pre-Procedure patient identification:  I am the primary operating surgeon/proceduralist and I have reviewed the applicable pathology reports and radiology studies for this procedure. I have identified the patient and confirmed laterality is right on 07/31/24 at 11:49 AM Remington Mendoza MD  Phone Number: [phone]

## 2024-07-31 NOTE — PROGRESS NOTES
Orthopaedic Surgery Progress Note    Interval History:  Patient is resting comfortably in bed. Pain currently well controlled. Patient responds to questions appropriately and follows commands.    Vital Signs:   Vitals:    07/31/24 0954   BP: (!) 163/92   Pulse: 76   Resp: 16   Temp: 36.4 °C (97.5 °F)   SpO2: 99%       Physical Exam:   General:   No acute distress   Symmetric chest rise bilaterally with normal effort     Musculoskeletal:   Right Upper Extremity  Inspection: Surgical incisions without erythema or drainage. Appropriate tenderness around the incision.  Motor: Decreased due to peripheral nerve block  Sensory: Decreased due to peripheral nerve block  Vascular: Palpable radial pulse. Brisk capillary refill      Assessment and Plan   Ephraim Roberts is a 54 y.o. male who is status post revision right reverse shoulder arthroplasty    -- POD: Day of Surgery   -- WBS: NWB RUE  -- ROM: 140/40  -- Brace: Sling in place   -- Analgesia   -- DVT PPX: ASA   -- Abx: ancef  -- Follow up OR cultures  -- PT OT recs appreciated   -- [facility] recs appreciated    Gaetano Mckenzie MD   Orthopaedic Surgery PGY-3

## 2024-07-31 NOTE — ANESTHESIA PREPROCEDURE EVALUATION
Relevant Problems   CARDIOVASCULAR   (+) HTN (hypertension)      NEUROLOGY   (+) Depression       Anesthesia ROS/MED HX    Anesthesia History    Previous anesthetics  No history of anesthetic complications  Neuro/Psych    Depression  Cardiovascular   dyslipidemia   hypertension   ECG reviewed   Normal ECG  Hematological no anemia  GI/Hepatic   GERD       Past Surgical History:   Procedure Laterality Date    COLONOSCOPY      KNEE ARTHROSCOPY Right 1994    SHOULDER ARTHROTOMY Right 1990    Calcium deposit on Growthplate    SHOULDER SURGERY Right 1991    SHOULDER SURGERY Right 1993    arthritis on clavicle    SHOULDER SURGERY Right 1998    open release Subscapula    SHOULDER SURGERY Right 2006    Partial shoulder replacement/ Dr Aviles    SHOULDER SURGERY Right 2007    Total Shoulder replacement/ Dr Mendoza    WISDOM TOOTH EXTRACTION      WRIST SURGERY Right 05/11/2017    wrist arthroscopy with ulner shortening osteotomy       Physical Exam    Airway   Mallampati: II   TM distance: >3 FB   Neck ROM: full  Cardiovascular - normal   Rhythm: regular   Rate: normalPulmonary - normal   clear to auscultation  Dental    Teeth Problems: upper dentures and lower dentures        Anesthesia Plan    Plan: general    Technique: general endotracheal and nerve block     Lines and Monitors: PIV     Airway: direct visual laryngoscopy and oral intubation    2 ASA  Blood Products:   Use of Blood Products Discussed: No   Anesthetic plan and risks discussed with: patient  Induction:    intravenous   Postop Plan:   Pain Management: interscalene block

## 2024-07-31 NOTE — CONSULTS
[facility]     Daily Progress Note       SUBJECTIVE   Interval History: Pt seen and examined in pacu. Denies any Cp or SOB, no dizziness or lightheadedness, no N/V.     OBJECTIVE      Vital signs in last 24 hours:  Temp:  [36.3 °C (97.3 °F)-36.4 °C (97.5 °F)] 36.3 °C (97.3 °F)  Heart Rate:  [76-97] 87  Resp:  [13-16] 16  BP: (137-163)/(66-92) 147/82    Intake/Output Summary (Last 24 hours) at 7/31/2024 1714  Last data filed at 7/31/2024 1618  Gross per 24 hour   Intake 1800 ml   Output 300 ml   Net 1500 ml       PHYSICAL EXAMINATION      Physical Exam  Constitutional:       General: He is not in acute distress.     Appearance: He is not toxic-appearing.   HENT:      Head: Normocephalic and atraumatic.   Eyes:      Conjunctiva/sclera: Conjunctivae normal.   Cardiovascular:      Rate and Rhythm: Normal rate and regular rhythm.      Heart sounds: Normal heart sounds.   Pulmonary:      Effort: Pulmonary effort is normal.      Breath sounds: Normal breath sounds.   Abdominal:      General: Bowel sounds are normal. There is no distension.      Palpations: Abdomen is soft.      Tenderness: There is no abdominal tenderness.   Musculoskeletal:      Cervical back: Neck supple.      Comments: RUE in sling   Skin:     General: Skin is warm and dry.   Neurological:      Mental Status: He is alert and oriented to person, place, and time.   Psychiatric:         Mood and Affect: Mood normal.         Behavior: Behavior normal.            LINES, CATHETERS, DRAINS, AIRWAYS, AND WOUNDS   Lines, Drains, and Airways:  Wounds (agree with documentation and present on admission):  Peripheral IV (Adult) 07/31/24 Left;Posterior Hand (Active)   Number of days: 0       Surgical Incision Shoulder Right (Active)   Number of days: 0         Comments:    LABS / IMAGING / TELE      Labs  Reviewed  Results from last 7 days   Lab Units 07/26/24  0831   WBC K/uL 4.87   HEMOGLOBIN g/dL 14.8   HEMATOCRIT % 43.3   PLATELETS K/uL 187       Results from last 7 days   Lab Units 07/26/24  0831   SODIUM mEQ/L 135*   POTASSIUM mEQ/L 4.5   CHLORIDE mEQ/L 101   CO2 mEQ/L 26   BUN mg/dL 9   CREATININE mg/dL 1.2   GLUCOSE mg/dL 98   CALCIUM mg/dL 9.9          Imaging  N/a    ECG/Telemetry  NSR on tele in pacu    ASSESSMENT AND PLAN      HTN (hypertension)  Assessment & Plan  Can resume lisinopril with hold parameters  Monitor BP    Depression  Assessment & Plan  Continue Wellbutrin    Lipid disorder  Assessment & Plan  Continue statin    S/P shoulder surgery  Assessment & Plan  S/p right revision to reverse total shoulder arthroplasty  Post op joint care, PT/OT, DVT prophylaxis and pain control per orthopedics team  Bowel regimen  Encourage incentive spirometry  Monitor post op labs         VTE Assessment: Padua    VTE Prophylaxis:  Current anticoagulants:    None      Code Status: No Order      Estimated Discharge Date: 8/1/2024   Disposition Planning: per ortho team     Kristopher Snyder MD  7/31/2024

## 2024-07-31 NOTE — ANESTHESIA PROCEDURE NOTES
Peripheral Block    Patient location during procedure: pre-op  Start time: 7/31/2024 1:00 PM  End time: 7/31/2024 1:10 PM  Reason for block: at surgeon's request and post-op pain management  Staffing  Performed: anesthesiologist   Anesthesiologist: Gwyn Velásquez DO  Performed by: Gwyn Velásquez DO  Authorized by: Gwyn Velásquez DO    Preanesthetic Checklist  Completed: patient identified, surgical consent, pre-op evaluation, timeout performed, IV checked, risks and benefits discussed, monitors and equipment checked and sterile field maintained during procedure  Peripheral Block  Patient position: supine  Prep: ChloraPrep and site prepped and draped  Patient monitoring: heart rate, cardiac monitor, continuous pulse ox and blood pressure  Block type: interscalene  Laterality: right  Injection technique: single-shot      Guidance: nerve stimulator and ultrasound guided  Image captured and stored.  Image visualization comments : Ultrasound used to visualize needle placement in appropriate tissue plane.: Ultrasound used to visualize medication disbursement around appropriate nerve structures.      Needle  Needle type: Tuohy   Needle gauge: 21 G  Needle length: 3 1/8 in  Test dose: negative  Assessment  Injection assessment: negative aspiration for heme, incremental injection, no paresthesia on injection, local visualized surrounding nerve on ultrasound and transient paresthesias  Paresthesia pain: immediately resolved  Heart rate change: no  Slow fractionated injection: yes  Medications Administered -   ropivacaine PF (NAROPIN) injection 0.5 % - perineural injection   30 mL - 7/31/2024 1:05:00 PM  dexAMETHasone (DECADRON) injection 4 mg/mL - perineural injection   4 mg - 7/31/2024 1:05:00 PM

## 2024-07-31 NOTE — NURSING NOTE
"Upon arrival from PACU, pt c/o \"a bubble\" in his chest- \"feels like I have to burp.\"  Upon auscultation HR sounds irregular.  HR elevated ().  Pt denies SOB, CP.  Dr. Wilder notified.  Orders received and carried out.    "

## 2024-07-31 NOTE — ANESTHESIOLOGIST PRE-PROCEDURE ATTESTATION
Pre-Procedure Patient Identification:  I am the Primary Anesthesiologist and have identified the patient on 07/31/24 at 12:21 PM.   I have confirmed the procedure(s) will be performed by the following surgeon/proceduralist Remington Mendoza MD.

## 2024-07-31 NOTE — ASSESSMENT & PLAN NOTE
S/p Revision to R rTSA on 7/31/24  - Management as per Ortho  - Pain control, DVT ppx, WBS, PT/OT as per Ortho  - Encourage IS  - Recommend bowel regimen

## 2024-07-31 NOTE — ANESTHESIA PROCEDURE NOTES
Airway  Urgency: elective    Start Time: 7/31/2024 1:21 PM  Airway not difficult    General Information and Staff    Patient location during procedure: OR  Anesthesiologist: Gwyn Velásquez DO  Resident/CRNA: Phil Childress CRNA  Performed: resident/CRNA   Performed by: Phil Childress CRNA  Authorized by: Gwyn Velásquez DO      Indications and Patient Condition  Indications for airway management: anesthesia  Sedation level: deep  Preoxygenated: yes  Patient position: sniffing  Mask difficulty assessment: 1 - vent by mask    Final Airway Details  Final airway type: endotracheal airway      Successful airway: ETT  Cuffed: yes   Successful intubation technique: direct laryngoscopy  Endotracheal tube insertion site: oral  Blade: Shraddha  Blade size: #3  ETT size (mm): 8.0  Cormack-Lehane Classification: grade I - full view of glottis  Placement verified by: chest auscultation and capnometry   Cuff volume (mL): 6  Measured from: teeth  ETT to teeth (cm): 22  Number of attempts at approach: 1  Atraumatic airway insertion

## 2024-07-31 NOTE — OP NOTE
Operative report    Date of Procedure: 7/31/2024    Preoperative diagnosis: Painful anatomic shoulder arthroplasty right shoulder T84.84 XA, loosening of glenoid component right shoulder T84.038A, rotator cuff tear right shoulder M75.121, presence of right anatomic total shoulder arthroplasty Z96.611    Postoperative diagnosis: Same    Procedure: Revision of both components to reverse total shoulder arthroplasty 57096    Surgeon: Reji    Assistant: Jony Wahl MD    Complications: None    Anesthesia: General with block.    Estimated blood loss 300 cc.    Indications: The patient is a 54-year-old male who had a prior anatomic total shoulder arthroplasty.  He has a painful shoulder with a rotator cuff tear and probable loose glenoid component.  He is brought to the operating for above procedure.    Findings: At the time of surgery, passive external rotation was 10 degrees passive elevation was 70.  We took 6 cultures 1 fluid 5 soft tissues and then gave the patient his antibiotics.  We reflected the entire anterior soft tissue envelope off the humerus, excised the inferior capsule while protecting the axillary nerve, but could not reattach the subscapularis at the end of the procedure.  It was not repairable.  The cephalic vein was not encountered.  We filled the glenoid vault with cancellous allograft chips and then placed a full wedge Jak component with a wedge at approximately 11:00 we had excellent purchase with all the screws before engaging the plate.  We used a 36 lateralized sphere I used a lateralized sphere because I believed that the glenoid was extremely medial and just putting in a standard glenoid sphere would have left it still much more medial than I wanted it therefore we used a +3 sphere size 36.  We cemented into the same cement mantle using a DJO standard length stem with antibiotic impregnated cement.  I bone grafted above the cement between the humeral canal and the stem.  In terms of  intact posterior cuff there was infraspinatus and teres minor supraspinatus was absent.  At the completion of procedure the patient had 45 or 50 degrees of external rotation with his arm at his side and 150 or 60 of elevation.  We did not use a drain.  We did place vancomycin powder.      Description of procedure: After proper written consent was obtained the patient was brought to the operating room placed on the operating table in supine position.  After adequate anesthesia been obtained, the patient shoulder was examined under anesthesia and the above findings were noted.  The patient was then brought to the edge of the operating table the operating table was placed in a beachchair position and the patient's right arm and shoulder were prepped and draped in normal sterile fashion.  His previous skin incision was utilized and extended distally by about 2-1/2 cm.  The incision was carried sharply down the level deep fascia.  As mentioned I could not identify the cephalic vein.  Therefore, I identified the tip of the coracoid and split the deltoid and pectoralis major from the tip of the coracoid all the way down to the deltoid tuberosity.  I then both bluntly and sharply dissected the pectoralis major major free from the underlying conjoined tendon.  I then identified the coracoacromial ligament, incised under it, and placed a blunt Hohmann there.  I then completely freed up the deltoid interspace between it and the humerus as well as within the subacromial space.  I then retracted the pectoralis major medially and the deltoid laterally using a Be retractor.  I then dissected under the conjoined tendon starting superiorly to inferiorly and we are able to identify the axillary nerve which was protected throughout the procedure.  The musculocutaneous nerve was not within our surgical field.  I then retracted conjoined tendon medially and deltoid laterally.  I aspirated about 3 or 4 cc of fluid from the joint it  did not appear to be purulent.  I then took a culture of the bicipital groove and the rotator interval.  I then reflected the anterior soft tissue envelope off the humerus starting at the rotator interval and extending distally.  I simultaneously abducted abducted and externally rotated the arm by releasing inferiorly so we could deliver the humerus into the wound.  Once we did, I took a culture of the humeral membrane and remove the humeral head as well as the ball taper.  I then cleaned all the soft tissue debris from around the humeral stem.  I then remove the bone from superior of the fence and also cleaned out in the back where we would grab the stem to remove it.  I then pulled the humerus laterally with a bone hook and released the entire posterior capsule.  A Fukuda was placed through this capsulotomy and the humerus was retracted posteriorly.  I was then able to identify the axillary nerve inferiorly and therefore then excised the entire inferior capsule while protecting it.  I then released the anterior capsule completely off the glenoid.  I then took a sample of the anterior capsule and posterior capsule.  This for this made our 6 culture and therefore we gave the patient his antibiotics.  We had the glenoid exposed with a reverse double-pronged Bankart retractor anteriorly, Fukuda posteriorly, and a blunt Hohmann posteriorly superiorly.  I was able to lever the glenoid component out easily.  We then used a combination of curettes and rongeurs to remove all the soft tissue debris from the glenoid vault we actually had what I thought was a circumferentially intact glenoid vault.  There was just a tiny bit of asymmetric erosion posterior superiorly.  I therefore then palpated at the base of the coracoid and placed a guidepin for the Jak implant and what I thought was at least close to the alternate centerline.  I then measured the amount of pin that was in bone and it was about 30 mm maybe 25.  I  thought this was acceptable.  I then took the peripheral reamer and reamed to make sure that we had a good seating place for the 25 mm full wedge baseplate.  I then packed the entire vault with cancellous bone.  I then used the boss drill to remove the cancellous bone from around the guidepin.  I then remove the guidepin and overdrilled the hole with a drill and then tapped it.  We then took a 25 mm full wedge baseplate with a wedge at about 11:00, and screwed it into position we had superb purchase with excellent compression.  The implant actually sat on the subchondral bone superiorly.  We then placed the compression screw and again we had excellent purchase and went back and forth between the central screw and the compression screw until we had superb purchase.  I then placed the 3 peripheral locking screws and they all had excellent purchase before engaging the plate.  I then put a blunt Hohmann inferiorly and remove some of the excess bone from the inferior glenoid to avoid impingement.  I then passed the peripheral reamer around the baseplate.  I then redelivered the humerus into the wound and attached the broach handle onto the stem we were able to remove it without difficulty.  I then reamed distally up to a size 12.  I used the epiphyseal reamer for the standard shell to ream the epiphysis.  I then used a sponge within the canal and placed the real stem size 12 with a standard liner and 30 degrees of retroversion.  This gave us a good interference fit.  I then trialed the standard liner and I thought it fit very nicely.  Therefore I removed the component, I remove the sponge and placed a cement plug distally.  We then copiously pulse irrigated.  I then placed cement within the distal third of the prepared humerus and the cement contained antibiotics.  I then impacted the real size 12 standard length stem and 30 degrees of retroversion and I put the top of the cup just about even with the top of the  tuberosity which is where it was during trialing.  I then waited for the cement to harden.  We then filled the remaining portion of the proximal humerus with bone graft.  We then impacted a standard liner which we had previously done.  I then reduced the humerus we had excellent stability excellent tension and no impingement.  We therefore then copiously pulse irrigated.  Digital palpation was again used to verify the integrity of the axillary nerve.  We then placed vancomycin powder in the subcutaneous tissue.  We did not use a drain.  We then closed the subcutaneous tissue with interrupted Vicryl and the skin was closed with staples.  The patient was then placed in a sterile dressing and a sling.  He tolerated the procedure well.  I was personally present for the entire operation except for skin closure.

## 2024-07-31 NOTE — BRIEF OP NOTE
Right Revision to Reverse Total Shoulder Arthroplasty (R) Procedure Note    Procedure:    Right Revision to Reverse Total Shoulder Arthroplasty  CPT(R) Code:  24393 - DC CANDIDA SHOULDER ARTHRPLSTY HUMERAL&GLENOID COMPNT      Pre-op Diagnosis     * Pain due to internal orthopedic prosthetic devices, implants and grafts, subsequent encounter [T84.84XD]       Post-op Diagnosis     * Pain due to internal orthopedic prosthetic devices, implants and grafts, subsequent encounter [T84.84XD]    Surgeon(s) and Role:     * Remington Mendoza MD - Primary    Anesthesia: Choice    Staff:   Circulator: Alexandr Underwood RN  Scrub Person: Dwight Del Angel RN    Procedure Details   Right Revision TSA to RSA    Estimated Blood Loss: 300 mL    Specimens:                Order Name Source Comment Collection Info Order Time   ANAEROBIC CULTURE / SMEAR (INCLUDES AEROBIC CULTURE) Shoulder, Right Hold specimen for 14 days for C. Acnes Collected By: Remington Mendoza MD 7/31/2024  2:25 PM     Release to patient   Immediate        FUNGAL CULTURE / SMEAR Shoulder, Right Hold specimen for 14 days for C. Acnes Collected By: Remington Mendoza MD 7/31/2024  2:25 PM     Release to patient   Immediate        ANAEROBIC CULTURE / SMEAR (INCLUDES AEROBIC CULTURE) Shoulder, Right Hold specimen for 14 days for C. Acnes Collected By: Remington Mendoza MD 7/31/2024  2:27 PM     Release to patient   Immediate        FUNGAL CULTURE / SMEAR Shoulder, Right Hold specimen for 14 days for C. Acnes Collected By: Remington Mendoza MD 7/31/2024  2:27 PM     Release to patient   Immediate        ANAEROBIC CULTURE / SMEAR (INCLUDES AEROBIC CULTURE) Shoulder, Right Hold specimen for 14 days for C. Acnes Collected By: Remington Mendoza MD 7/31/2024  2:29 PM     Release to patient   Immediate        FUNGAL CULTURE / SMEAR Shoulder, Right Hold specimen for 14 days for C. Acnes Collected By: Remington Mendoza MD 7/31/2024  2:29 PM     Release to  patient   Immediate        ANAEROBIC CULTURE / SMEAR (INCLUDES AEROBIC CULTURE) Shoulder, Right Hold specimen for 14 days for C. Acnes Collected By: Remington Mendoza MD 7/31/2024  2:40 PM     Release to patient   Immediate        FUNGAL CULTURE / SMEAR Shoulder, Right Hold specimen for 14 days for C. Acnes Collected By: Remington Mendoza MD 7/31/2024  2:40 PM     Release to patient   Immediate        ANAEROBIC CULTURE / SMEAR (INCLUDES AEROBIC CULTURE) Shoulder, Right Hold specimen for 14 days for C. Acnes Collected By: Remington Mendoza MD 7/31/2024  2:41 PM     Release to patient   Immediate        FUNGAL CULTURE / SMEAR Shoulder, Right Hold specimen for 14 days for C. Acnes Collected By: Remington Mendoza MD 7/31/2024  2:41 PM     Release to patient   Immediate        REPEAT ABO/RH (TYPE CHECK) Blood, Venous  Collected By: Kyra Hansen RN 7/31/2024 10:03 AM     Release to patient   Immediate        SYNOVIAL CELL COUNT W/ CRYSTALS Shoulder, Right Hold specimen for 14 days for C. Acnes Collected By: Remington Mendoza MD 7/31/2024  2:28 PM     Release to patient   Immediate              Drains: * No LDAs found *    Implants:   Implant Name Type Inv. Item Serial No.  Lot No. LRB No. Used Action   CHIPS CANCELLOUS 30CC 1-8MM - [id number]  CHIPS CANCELLOUS 30CC 1-8MM  [facility] [id number] Right 1 Implanted   AEQUALIS PERFORM REVERSED AUGMENT BASEPLATE - [id number] - [id number]  AEQUALIS PERFORM REVERSED AUGMENT BASEPLATE [id number] [facility] TECH  Right 1 Implanted   PERFORM REVERSED CENTRAL SCREW 6.5MM X 25MM - [id number]  PERFORM REVERSED CENTRAL SCREW 6.5MM X 25MM  [facility] TECH  Right 1 Implanted   PERFORM REVERSED PERIPHERAL SCREW 5.0MM X 26MM - [id number]  PERFORM REVERSED PERIPHERAL SCREW 5.0MM X 26MM  [facility] TECH  Right 1 Implanted   SCREW [id number] - [id number]  SCREW [id number]  [facility] TECH  Right 2 Implanted   PERFORM REVERSED PERIPHERAL SCREW 22MM X 5MM  - [id number]  PERFORM REVERSED PERIPHERAL SCREW 22MM X 5MM  [employer] [facility]  Right 1 Implanted   REVERSED RAGINI COCR GLENOSPHERE 3MM - [id number] - [id number]  REVERSED RAGINI COCR GLENOSPHERE 3MM [id number] [facility]  Right 1 Implanted   RESTRICTOR CEMENT 12MM - [id number]  RESTRICTOR CEMENT 12MM  [employer] [id number] Right 1 Implanted   CEMENT BONE SIMPLEX W/TOBRAMYCIN - [id number]  CEMENT BONE SIMPLEX W/TOBRAMYCIN  [employer] [id number] Right 2 Implanted   HUMERAL STEM 12MM X108 REVERSE - [id number]  HUMERAL STEM 12MM X108 REVERSE  [employer] [id number] Right 1 Implanted   HUMERAL SOCKET INSERT SZ 36MM STD RSP - [id number]  HUMERAL SOCKET INSERT SZ 36MM STD RSP  [employer] [id number] Right 1 Implanted              Complications:  None; patient tolerated the procedure well.           Disposition: PACU - hemodynamically stable.           Condition: stable    Remington Mendoza MD  Phone Number: [phone]

## 2024-08-01 VITALS
DIASTOLIC BLOOD PRESSURE: 65 MMHG | TEMPERATURE: 97.4 F | HEART RATE: 104 BPM | OXYGEN SATURATION: 98 % | RESPIRATION RATE: 20 BRPM | SYSTOLIC BLOOD PRESSURE: 141 MMHG

## 2024-08-01 LAB
ANION GAP SERPL CALC-SCNC: 7 MEQ/L (ref 3–15)
ATRIAL RATE: 106
BUN SERPL-MCNC: 16 MG/DL (ref 7–25)
CALCIUM SERPL-MCNC: 8.8 MG/DL (ref 8.6–10.3)
CHLORIDE SERPL-SCNC: 103 MEQ/L (ref 98–107)
CO2 SERPL-SCNC: 22 MEQ/L (ref 21–31)
CREAT SERPL-MCNC: 1 MG/DL (ref 0.7–1.3)
CRYSTALS SNV MICRO: NORMAL
EGFRCR SERPLBLD CKD-EPI 2021: >60 ML/MIN/1.73M*2
ERYTHROCYTE [DISTWIDTH] IN BLOOD BY AUTOMATED COUNT: 12.4 % (ref 11.6–14.4)
FUNGUS STAIN: NORMAL
GLUCOSE SERPL-MCNC: 128 MG/DL (ref 70–99)
HCT VFR BLD AUTO: 34.3 % (ref 40.1–51)
HGB BLD-MCNC: 11.9 G/DL (ref 13.7–17.5)
MCH RBC QN AUTO: 30.5 PG (ref 28–33.2)
MCHC RBC AUTO-ENTMCNC: 34.7 G/DL (ref 32.2–36.5)
MCV RBC AUTO: 87.9 FL (ref 83–98)
P AXIS: 20
PDW BLD AUTO: 11.2 FL (ref 9.4–12.4)
PLATELET # BLD AUTO: 155 K/UL (ref 150–350)
POTASSIUM SERPL-SCNC: 4.4 MEQ/L (ref 3.5–5.1)
PR INTERVAL: 140
QRS DURATION: 80
QT INTERVAL: 350
QTC CALCULATION(BAZETT): 456
R AXIS: 30
RBC # BLD AUTO: 3.9 M/UL (ref 4.5–5.8)
SODIUM SERPL-SCNC: 132 MEQ/L (ref 136–145)
T WAVE AXIS: 44
VENTRICULAR RATE: 102
WBC # BLD AUTO: 10.67 K/UL (ref 3.8–10.5)

## 2024-08-01 PROCEDURE — 25800000 HC PHARMACY IV SOLUTIONS: Performed by: NURSE PRACTITIONER

## 2024-08-01 PROCEDURE — 36415 COLL VENOUS BLD VENIPUNCTURE: CPT | Performed by: NURSE PRACTITIONER

## 2024-08-01 PROCEDURE — 80048 BASIC METABOLIC PNL TOTAL CA: CPT | Performed by: NURSE PRACTITIONER

## 2024-08-01 PROCEDURE — 99232 SBSQ HOSP IP/OBS MODERATE 35: CPT | Performed by: HOSPITALIST

## 2024-08-01 PROCEDURE — 63700000 HC SELF-ADMINISTRABLE DRUG: Performed by: NURSE PRACTITIONER

## 2024-08-01 PROCEDURE — 85027 COMPLETE CBC AUTOMATED: CPT | Performed by: NURSE PRACTITIONER

## 2024-08-01 PROCEDURE — 97162 PT EVAL MOD COMPLEX 30 MIN: CPT | Mod: GP

## 2024-08-01 PROCEDURE — 97166 OT EVAL MOD COMPLEX 45 MIN: CPT | Mod: GO

## 2024-08-01 PROCEDURE — 63600000 HC DRUGS/DETAIL CODE: Mod: JZ | Performed by: NURSE PRACTITIONER

## 2024-08-01 PROCEDURE — 93010 ELECTROCARDIOGRAM REPORT: CPT | Performed by: INTERNAL MEDICINE

## 2024-08-01 RX ORDER — NAPROXEN SODIUM 220 MG/1
81 TABLET, FILM COATED ORAL DAILY
Start: 2024-08-01

## 2024-08-01 RX ORDER — ACETAMINOPHEN 500 MG
1000 TABLET ORAL
Start: 2024-08-01

## 2024-08-01 RX ORDER — OXYCODONE HYDROCHLORIDE 5 MG/1
5 TABLET ORAL EVERY 6 HOURS PRN
Start: 2024-08-01

## 2024-08-01 RX ORDER — DOXYCYCLINE 100 MG/1
100 CAPSULE ORAL 2 TIMES DAILY
Start: 2024-08-01 | End: 2024-08-15

## 2024-08-01 RX ADMIN — DOXYCYCLINE HYCLATE 100 MG: 100 TABLET, FILM COATED ORAL at 08:01

## 2024-08-01 RX ADMIN — ASPIRIN 81 MG CHEWABLE TABLET 81 MG: 81 TABLET CHEWABLE at 08:02

## 2024-08-01 RX ADMIN — DOCUSATE SODIUM AND SENNOSIDES 1 TABLET: 8.6; 5 TABLET, FILM COATED ORAL at 08:01

## 2024-08-01 RX ADMIN — LISINOPRIL 10 MG: 10 TABLET ORAL at 08:02

## 2024-08-01 RX ADMIN — ATORVASTATIN CALCIUM 20 MG: 20 TABLET, FILM COATED ORAL at 08:01

## 2024-08-01 RX ADMIN — OXYCODONE HYDROCHLORIDE 10 MG: 5 TABLET ORAL at 09:22

## 2024-08-01 RX ADMIN — ACETAMINOPHEN 650 MG: 325 TABLET ORAL at 08:01

## 2024-08-01 RX ADMIN — ACETAMINOPHEN 650 MG: 325 TABLET ORAL at 02:33

## 2024-08-01 RX ADMIN — POLYETHYLENE GLYCOL 3350 17 G: 17 POWDER, FOR SOLUTION ORAL at 08:00

## 2024-08-01 RX ADMIN — CEFAZOLIN 2 G: 2 INJECTION, POWDER, FOR SOLUTION INTRAMUSCULAR; INTRAVENOUS at 06:12

## 2024-08-01 RX ADMIN — BUPROPION HYDROCHLORIDE 150 MG: 150 TABLET, EXTENDED RELEASE ORAL at 08:01

## 2024-08-01 RX ADMIN — OXYCODONE HYDROCHLORIDE 5 MG: 5 TABLET ORAL at 06:10

## 2024-08-01 RX ADMIN — PANTOPRAZOLE SODIUM 40 MG: 40 TABLET, DELAYED RELEASE ORAL at 08:01

## 2024-08-01 ASSESSMENT — COGNITIVE AND FUNCTIONAL STATUS - GENERAL
DRESSING REGULAR LOWER BODY CLOTHING: 4 - NONE
WALKING IN HOSPITAL ROOM: 4 - NONE
AFFECT: WFL
HELP NEEDED FOR PERSONAL GROOMING: 4 - NONE
HELP NEEDED FOR BATHING: 3 - A LITTLE
EATING MEALS: 3 - A LITTLE
AFFECT: WFL
CLIMB 3 TO 5 STEPS WITH RAILING: 4 - NONE
MOVING TO AND FROM BED TO CHAIR: 4 - NONE
TOILETING: 4 - NONE
STANDING UP FROM CHAIR USING ARMS: 4 - NONE
DRESSING REGULAR UPPER BODY CLOTHING: 3 - A LITTLE

## 2024-08-01 ASSESSMENT — PAIN SCALES - GENERAL: PAIN_LEVEL: 4

## 2024-08-01 NOTE — ANESTHESIA POSTPROCEDURE EVALUATION
Patient: Ephraim Roberts    Procedure Summary       Date: 07/31/24 Room / Location: [facility] PAV OR 06 / [facility] OR [facility]    Anesthesia Start: 1312 Anesthesia Stop: 1642    Procedure: Right Revision to Reverse Total Shoulder Arthroplasty (Right: Shoulder) Diagnosis:       Pain due to internal orthopedic prosthetic devices, implants and grafts, subsequent encounter      (Pain Due to Internal Orthopedic Prosthetic Devices, Implants and Grafts, Subsequent Encounter)    Surgeons: Remington Mendoza MD Responsible Provider: Gwyn Velásquez DO    Anesthesia Type: general ASA Status: 2            Anesthesia Type: general  PACU Vitals  7/31/2024 1633 - 7/31/2024 1733        7/31/2024  1640 7/31/2024  1645 7/31/2024  1700 7/31/2024  1715    BP: 140/67 137/66 147/82 160/84    Temp: 36.3 °C (97.3 °F) -- -- --    Pulse: 97 92 87 90    Resp: 13 16 16 17    SpO2: 97 % 98 % 99 % 99 %                7/31/2024 1730             BP: 153/85       Temp: --       Pulse: 90       Resp: 14       SpO2: 98 %                 Anesthesia Post Evaluation    Pain score: 4  Pain management: satisfactory to patient  Mode of pain management: IV medication  Patient location during evaluation: PACU  Patient participation: complete - patient participated  Level of consciousness: awake  Cardiovascular status: acceptable  Airway Patency: adequate  Respiratory status: acceptable  Hydration status: stable  Anesthetic complications: no

## 2024-08-01 NOTE — PROGRESS NOTES
Physical Therapy -  Initial Evaluation     Patient: Ephraim Roberts  Location: [facility] [id number]  MRN: [id number]  Today's date: 8/1/2024    HISTORY OF PRESENT ILLNESS     Ephraim is a 54 y.o. 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 Principal problem is No Principal Problem: There is no principal problem currently on the Problem List. Please update the Problem List and refresh..    Past Medical History  Ephraim has a past medical history of Arthritis, Depression, GERD (gastroesophageal reflux disease), Hypertension, and Lipid disorder.    History of Present Illness   Ephraim is 55 yo with hx of previous R TSA now s/p R rTSA on 7/31. NWB to RUE  PROM 140/40  PRIOR LEVEL OF FUNCTION AND LIVING ENVIRONMENT     Prior Level of Function      Flowsheet Row Most Recent Value   Dominant Hand left   Ambulation independent   Transferring independent   Toileting independent   Bathing independent   Dressing independent   Eating independent   IADLs independent   Driving/Transportation    Communication understands/communicates without difficulty   Prior Level of Function Comment Independent at baseline for ADLs and IADLs, works full time   Assistive Device Currently Used at Home none             Prior Living Environment      Flowsheet Row Most Recent Value   People in Home alone   Current Living Arrangements home   Living Environment Comment 2 SH, 0 RAAD, FF to B&B, tub shower          VITALS AND PAIN     PT Vitals      Date/Time BP [facility]   08/01/24 0808 141/65 [place]          PT Pain      Date/Time Pain Type Side/Orientation Location Rating: Rest Rating: Activity Interventions [facility]   08/01/24 0808 Pain Assessment right shoulder 5 5 care clustered [place]             Objective   OBJECTIVE     Start time:  0808  End time:  0818  Session Length: 10 min  Mode of  Treatment: co-treatment  Reason for co-treatment: co treat to expedite DC    General Observations  Patient received upright, in chair. He was no issues or concerns identified by nurse prior to session, agreeable to therapy.      Precautions: fall, weight bearing, brace worn at all times, shoulder, range of motion  Extremity Weight-Bearing Status: right upper extremity  Right UE Weight-Bearing Status: non weight-bearing (NWB)           PT Eval and Treat - 08/01/24 0808          Cognition    Orientation Status oriented x 4     Affect/Mental Status WFL     Follows Commands WFL     Cognitive Function WFL        Vision Assessment/Intervention    Vision Assessment WFL        Hearing Assessment    Hearing Status WFL        Sensory Assessment    Sensory Assessment sensation intact, lower extremities        Lower Extremity Assessment    LE Assessment ROM and Strength WFL        Mobility Belt    Mobility Belt Used During Session no - independent with all mobility        Sit/Stand Transfer    Surface chair with arm rests     Delta independent     Assistive Device none     Transfer Comments from bedside recliner, no (A) needed, no overt LOB        Car Transfer    Transfer Technique sit-stand;stand-sit     Delta independent     Assistive Device none     Comment car height simulated to that for pt's DC home, completed transfer without (A) or difficulty        Gait Training    Delta, Gait independent     Assistive Device none     Distance in Feet 200 feet     Pattern step-through     Comment ambulated independently in hallway, no overt LOB        Stairs Training    Delta, Stairs modified independence     Assistive Device railing     Handrail Location (Stairs) right side (ascending);right side (descending)     Number of Stairs 4     Stair Height 6 inches     Ascending Stairs Technique step-to-step     Descending Stairs Technique step-to-step     Comment educated pt regarding side step method with LUIS MIGUEL   on R rail, pt able to complete without difficulty        Balance    Static Sitting Balance WFL     Dynamic Sitting Balance WFL     Sit to Stand Dynamic Balance WFL     Static Standing Balance WFL     Dynamic Standing Balance WFL     Comment, Balance no balance deficits        Upper Extremity (Therapeutic Exercise)    Comment please refer to OT's note regarding education on shoulder ROM protocol                                    Education Documentation  Unresolved/Worsening Symptoms, taught by Colleen Bailey PT at 8/1/2024  8:31 AM.  Learner: Patient  Readiness: Acceptance  Method: Explanation, Demonstration, Handout  Response: Demonstrated Understanding, Verbalizes Understanding  Comment: reviewed PT POC, role of PT    Joint Mobility/Strength, taught by Colleen Bailey PT at 8/1/2024  8:31 AM.  Learner: Patient  Readiness: Acceptance  Method: Explanation, Demonstration, Handout  Response: Demonstrated Understanding, Verbalizes Understanding  Comment: reviewed PT POC, role of PT    Home Safety, taught by Colleen Bailey PT at 8/1/2024  8:31 AM.  Learner: Patient  Readiness: Acceptance  Method: Explanation, Demonstration, Handout  Response: Demonstrated Understanding, Verbalizes Understanding  Comment: reviewed PT POC, role of PT    Energy Conservation, taught by Colleen Bailey PT at 8/1/2024  8:31 AM.  Learner: Patient  Readiness: Acceptance  Method: Explanation, Demonstration, Handout  Response: Demonstrated Understanding, Verbalizes Understanding  Comment: reviewed PT POC, role of PT    Assistive/Adaptive Devices, taught by Colleen Bailey PT at 8/1/2024  8:31 AM.  Learner: Patient  Readiness: Acceptance  Method: Explanation, Demonstration, Handout  Response: Demonstrated Understanding, Verbalizes Understanding  Comment: reviewed PT POC, role of PT    Signs/Symptoms, taught by Colleen Bailey PT at 8/1/2024  8:31 AM.  Learner: Patient  Readiness: Acceptance  Method: Explanation,  Demonstration, Handout  Response: Demonstrated Understanding, Verbalizes Understanding  Comment: reviewed PT POC, role of PT    Risk Factors, taught by Colleen Bailey PT at 8/1/2024  8:31 AM.  Learner: Patient  Readiness: Acceptance  Method: Explanation, Demonstration, Handout  Response: Demonstrated Understanding, Verbalizes Understanding  Comment: reviewed PT POC, role of PT        Session Outcome  Patient in therapy gym (with OT) at end of session, all needs met. Nursing notified about patient's response to therapy/activity, patient's position, and patient's performance.    AM-PAC™ - Mobility (Current Function)     Turning form your back to your side while in flat bed without using bedrails 4 - None   Moving from lying on your back to sitting on the side of a flat bed without using bedrails 4 - None   Moving to and from a bed to a chair 4 - None   Standing up from a chair using your arms 4 - None   To walk in a hospital room 4 - None   Climbing 3-5 steps with a railing 4 - None   AM-PAC™ Mobility Score 24      ASSESSMENT AND PLAN     Progress Summary  8/1/24 Pt is a 54 year old male s/p revision R rTSA post op day one. Pt seen for PT session. The patient was educated on the role of PT in the acute care setting. The patient was instructed on proper technique when performing OOB transfers to ensure their safety.   Pt can complete functional tasks including performing transfers from low surface without an AD and without assist, ambulating community length distances without an AD without difficulty; no balance or coordination deficits identified; he was provided education on establishing a walking program when home to progress mobility- pt verbalized understanding; no further skilled PT needs identified; pt plans to return home at [place]         PT Plan      Flowsheet Row Most Recent Value   Therapy Frequency evaluation only at 08/01/2024 0808            PT Discharge Recommendations      Flowsheet Row Most Recent  Value   PT Recommended Discharge Disposition home with assistance at 08/01/2024 0808   Anticipated Equipment Needs if Discharged Home (PT) none at 08/01/2024 0808

## 2024-08-01 NOTE — PLAN OF CARE
Problem: Adult Inpatient Plan of Care  Goal: Plan of Care Review  Outcome: Progressing  Flowsheets (Taken 8/1/2024 5390)  Outcome Evaluation:   8/1/24 Pt is a 54 year old male s/p revision R rTSA post op day one. Pt seen for PT session. The patient was educated on the role of PT in the acute care setting. The patient was instructed on proper technique when performing OOB transfers to ensure their safety. Pt can complete functional tasks including performing transfers from low surface without an AD and without assist, ambulating community length distances without an AD without difficulty   no balance or coordination deficits identified   he was provided education on establishing a walking program when home to progress mobility- pt verbalized understanding   no further skilled PT needs identified   pt plans to return home at [place]  Plan of Care Reviewed With: patient

## 2024-08-01 NOTE — PLAN OF CARE
Problem: Shoulder Arthroplasty (Total, Giorgio, Reverse)  Goal: Optimal Functional Ability  Outcome: Progressing

## 2024-08-01 NOTE — PLAN OF CARE
Pt OOB standby. VINCENT in sling. Voiding. Tolerating RA. IV ancef given. R hand numb s/p block, c/o no pain. Optifoam with small dried drainage. Plan for d/c home when medically cleared.

## 2024-08-01 NOTE — PLAN OF CARE
Care Coordination Discharge Plan Summary    Admission Assessment Summary    General Information  Readmission Within the last 30 days: no previous admission in last 30 days  Does patient have a : No  Patient-Specific Goals (include timeframe): Return home    Living Arrangements  Arrived From: home  Current Living Arrangements: home  People in Home: alone  Home Accessibility: stairs within home (Group)  Living Arrangement Comments: Pt resides alone in a 2SH w 2 RAAD.    Social Determinants of Health - Screenings  Housing Stability  In the last 12 months, was there a time when you were not able to pay the mortgage or rent on time?: No  In the last 12 months, how many places have you lived?: 1  In the last 12 months, was there a time when you did not have a steady place to sleep or slept in a shelter (including now)?: No  Utility Access  In the past 12 months has the electric, gas, oil, or water company threatened to shut off services in your home?: No  Transportation Needs  In the past 12 months, has lack of transportation kept you from medical appointments or from getting medications?: No  In the past 12 months, has lack of transportation kept you from meetings, work, or from getting things needed for daily living?: No    Functional Status Prior to Admission  Assistive Device/Animal Currently Used at Home: none  Functional Status Comments: Independent w mobility  IADL Comments: Independent w ADL's/IADL's. +    Discharge Needs Assessment    Concerns to be Addressed: no discharge needs identified  Current Discharge Risk: physical impairment, lives alone  Anticipated Changes Related to Illness: none    Discharge Plan Summary    Patient Choice  Offered/Gave Vendor List: no       Concerns / Comments: S/p shoulder surgery. Cleared for home with assist. No dischrge needs likely.    Discharge Plan:  Disposition/Destination: Home  / Home       Connection to Community  Not applicable  Community Resources:       Discharge Transportation:  Is Out of Hospital DNR needed at Discharge: no  Does patient need discharge transport? No      Patient medically stable for discharge home today. No discharge needs identified per care progression rounds. Friend to transport home.     DCP  Home  Friend to transport

## 2024-08-01 NOTE — PROGRESS NOTES
Hospital Medicine     Daily Progress Note       SUBJECTIVE   Interval History:  Patient reports severe uncontrolled pain since the block wore off. He recently received 10mg od Oxycodone, he is hoping that will help cover his pain.   He denies HA, dizziness, CP, dyspnea, abdominal pain, N/V.      OBJECTIVE      Vital signs in last 24 hours:  Temp:  [36.3 °C (97.3 °F)-36.6 °C (97.8 °F)] 36.3 °C (97.4 °F)  Heart Rate:  [] 104  Resp:  [13-25] 20  BP: (128-179)/(64-92) 141/65    Intake/Output Summary (Last 24 hours) at 8/1/2024 0909  Last data filed at 8/1/2024 0715  Gross per 24 hour   Intake 1800 ml   Output 4100 ml   Net -2300 ml       PHYSICAL EXAMINATION      Physical Exam  Vitals and nursing note reviewed.   Constitutional:       General: He is not in acute distress.     Appearance: Normal appearance. He is obese. He is not toxic-appearing.   HENT:      Head: Normocephalic and atraumatic.   Eyes:      Conjunctiva/sclera: Conjunctivae normal.   Cardiovascular:      Rate and Rhythm: Normal rate and regular rhythm.      Heart sounds: Normal heart sounds.   Pulmonary:      Effort: Pulmonary effort is normal. No respiratory distress.      Breath sounds: Normal breath sounds.   Abdominal:      General: Bowel sounds are normal. There is no distension.      Palpations: Abdomen is soft.      Tenderness: There is no abdominal tenderness.   Musculoskeletal:      Cervical back: Neck supple.      Comments: RUE neurovascularly intact distally, + radial pulse, in sling   Skin:     General: Skin is warm and dry.   Neurological:      General: No focal deficit present.      Mental Status: He is oriented to person, place, and time.   Psychiatric:         Mood and Affect: Mood normal.        LINES, CATHETERS, DRAINS, AIRWAYS, AND WOUNDS   Lines, Drains, and Airways:  Wounds (agree with documentation and present on admission):  Peripheral IV (Adult) 07/31/24 Left;Posterior Hand (Active)   Number of days: 1       Surgical  Incision Shoulder Right (Active)   Number of days: 1         Comments:    LABS / IMAGING / TELE      Labs  Results from last 7 days   Lab Units 08/01/24  0252 07/26/24  0831   SODIUM mEQ/L 132* 135*   POTASSIUM mEQ/L 4.4 4.5   CHLORIDE mEQ/L 103 101   CO2 mEQ/L 22 26   BUN mg/dL 16 9   CREATININE mg/dL 1.0 1.2   CALCIUM mg/dL 8.8 9.9   GLUCOSE mg/dL 128* 98     Results from last 7 days   Lab Units 08/01/24  0252 07/26/24  0831   WBC K/uL 10.67* 4.87   HEMOGLOBIN g/dL 11.9* 14.8   HEMATOCRIT % 34.3* 43.3   PLATELETS K/uL 155 187     Microbiology Results       Procedure Component Value Units Date/Time    Anaerobic Culture / Smear (includes Aerobic Culture) Shoulder, Right [[id number]] Collected: 07/31/24 1440    Specimen: Tissue from Shoulder, Right Updated: 08/01/24 0825     Culture No growth at 18-24 hours     Gram Stain Result 1+ WBC Seen      No organisms seen    Anaerobic Culture / Smear (includes Aerobic Culture) Shoulder, Right [[id number]] Collected: 07/31/24 1430    Specimen: Tissue from Shoulder, Right Updated: 08/01/24 0827     Culture No growth at 18-24 hours     Gram Stain Result No WBC Seen      No organisms seen    Anaerobic Culture / Smear (includes Aerobic Culture) Shoulder, Right [[id number]] Collected: 07/31/24 1429    Specimen: Tissue from Shoulder, Right Updated: 08/01/24 0827     Culture No growth at 18-24 hours     Gram Stain Result No WBC Seen      No organisms seen    Anaerobic Culture / Smear (includes Aerobic Culture) Shoulder, Right [[id number]] Collected: 07/31/24 1426    Specimen: Tissue from Shoulder, Right Updated: 08/01/24 0825     Culture No growth at 18-24 hours     Gram Stain Result No WBC Seen      No organisms seen    Anaerobic Culture / Smear (includes Aerobic Culture) Shoulder, Right [[id number]] Collected: 07/31/24 1424    Specimen: Tissue from Shoulder, Right Updated: 08/01/24 0826     Culture No growth at 18-24 hours     Gram Stain Result No WBC Seen      No organisms seen           Above labs have been personally reviewed.     ECG/Telemetry  Patient is not on telemetry.    ASSESSMENT AND PLAN      * S/P shoulder surgery  Assessment & Plan  S/p Revision to R rTSA on 7/31/24  - Management as per Ortho  - Pain control, DVT ppx, WBS, PT/OT as per Ortho  - Encourage IS  - Recommend bowel regimen    HTN (hypertension)  Assessment & Plan  - Continue lisinopril with hold parameters    Depression  Assessment & Plan  - Continue Wellbutrin    Lipid disorder  Assessment & Plan  - Continue statin         VTE Assessment: Padua    VTE Prophylaxis:  Current anticoagulants:    None      Code Status: Full Code      Estimated Discharge Date: 8/1/2024     Disposition Planning: as per Ortho     SURESH [place]  8/1/2024

## 2024-08-01 NOTE — PLAN OF CARE
Plan of Care Review  Progress: improving  Outcome Evaluation: OOB with standby assist.  Taking PRN oxycodone for pain.  Plan for DC home this afternon

## 2024-08-01 NOTE — PROGRESS NOTES
Orthopaedic Surgery Progress Note    Interval History:  Felt like he had a bubble in his chest overnight. EKG was done and was normal per [facility] Patient is resting comfortably in bed this morning. Pain currently a 6 out of 10. Patient responds to questions appropriately and follows commands.    Vital Signs:   Vitals:    08/01/24 0245   BP: 128/68   Pulse: 86   Resp:    Temp: 36.4 °C (97.6 °F)   SpO2: 94%     Physical Exam:   General:   No acute distress   Symmetric chest rise bilaterally with normal effort     Musculoskeletal:   Right Upper Extremity  Inspection: Surgical incisions without erythema or drainage. Appropriate tenderness around the incision.  Motor: Decreased due to peripheral nerve block  Sensory: Decreased due to peripheral nerve block  Vascular: Palpable radial pulse. Brisk capillary refill.    Assessment and Plan   Ephraim Roberts is a 54 y.o. male who is status post revision right reverse shoulder arthroplasty.    -- POD: 1 Day Post-Op   -- WBS: NWB RUE  -- ROM: 140/40  -- Brace: Sling in place   -- Analgesia   -- DVT PPX: ASA   -- Abx: ancef  -- Follow up OR cultures  -- PT OT recs appreciated   -- [facility] [place] appreciated    Catalina Levy MD   Orthopaedic Surgery PGY-1

## 2024-08-01 NOTE — HOSPITAL COURSE
Ephraim is a 54 y.o. 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 Principal problem is No Principal Problem: There is no principal problem currently on the Problem List. Please update the Problem List and refresh..    Past Medical History  Ephraim has a past medical history of Arthritis, Depression, GERD (gastroesophageal reflux disease), Hypertension, and Lipid disorder.    History of Present Illness   Ephraim is 55 yo with hx of previous R TSA now s/p R rTSA on 7/31. NWB to RUE  PROM 140/40

## 2024-08-01 NOTE — PROGRESS NOTES
Occupational Therapy -  Initial Evaluation     Patient: Ephraim Roberts  Location: [facility] 5PAV [id number]  MRN: [id number]  Today's date: 8/1/2024    HISTORY OF PRESENT ILLNESS     Ephraim is a 54 y.o. 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 Principal problem is No Principal Problem: There is no principal problem currently on the Problem List. Please update the Problem List and refresh..    Past Medical History  Ephraim has a past medical history of Arthritis, Depression, GERD (gastroesophageal reflux disease), Hypertension, and Lipid disorder.    History of Present Illness   Ephraim is 53 yo with hx of previous R TSA now s/p R rTSA on 7/31. NWB to RUE  PROM 140/40  PRIOR LEVEL OF FUNCTION AND LIVING ENVIRONMENT     Prior Level of Function      Flowsheet Row Most Recent Value   Dominant Hand left   Ambulation independent   Transferring independent   Toileting independent   Bathing independent   Dressing independent   Eating independent   IADLs independent   Driving/Transportation    Communication understands/communicates without difficulty   Prior Level of Function Comment Independent at baseline for ADLs and IADLs, works full time   Assistive Device Currently Used at Home none             Prior Living Environment      Flowsheet Row Most Recent Value   People in Home alone   Current Living Arrangements home   Home Accessibility stairs within home (Group)   Living Environment Comment 2 SH, 0 RAAD, FF to B&B, tub shower          Occupational Profile      Flowsheet Row Most Recent Value   Successful Occupations health technician   Environmental Supports and Barriers lives alone          VITALS AND PAIN     OT Vitals      Date/Time Pulse HR Source Resp SpO2 Pt Activity O2 Therapy BP BP Location BP Method Pt Position [facility]   08/01/24 0806 104  Monitor 20 98 % At rest None (Room air) 141/65 Left upper arm Automatic Sitting SS   08/01/24 0808 -- -- -- -- -- -- 141/65 -- -- -- [place]          OT Pain      Date/Time Pain Type Side/Orientation Location Rating: Rest Rating: Activity Interventions [facility]   08/01/24 0808 Pain Assessment shoulder right 5 5 care clustered [place]             Objective   OBJECTIVE     Start time:  0805  End time:  0827  Session Length: 22 min  Mode of Treatment: co-treatment  Reason for co-treatment: cotx initially, transitioning to individual therapy for ADL edu/training    General Observations  Patient received upright, in chair. He was no issues or concerns identified by nurse prior to session, agreeable to therapy. motivated    Precautions: fall, weight bearing, brace worn at all times, shoulder, range of motion (140/40)  Extremity Weight-Bearing Status: right upper extremity  Right UE Weight-Bearing Status: non weight-bearing (NWB)           OT Eval and Treat - 08/01/24 0805          Cognition    Orientation Status oriented x 4     Affect/Mental Status WFL     Follows Commands WFL     Cognitive Function WFL     Comment, Cognition Pt A&O, no cog deficits noted        Vision Assessment/Intervention    Vision Assessment corrective lenses full-time        Hearing Assessment    Hearing Status WNL        Sensory Assessment    Sensory Assessment sensation intact, upper extremities;other (see comments)   denies numbness/tingling of RUE       Upper Extremity Assessment    UE Assessment ROM and Strength WFL except     General Observations LUE 5/5 MMT, AROM WFL. RUE not formally assessed d/t rTSA, elbow/wrist/digits WFL        Upper Extremity Range of Motion    Shoulder, Right (ROM) PROM ~ 45 degrees shoulder flexion     Elbow, Right (ROM) WFL     Wrist, Right (ROM) WFL     Hand, Right (ROM) WFL        Bed Mobility    [place] not tested     Comment Pt planning to sleep in recliner. Pt rcv'd edu in wedge pillow option for home, Pt receptive         Mobility Belt    Mobility Belt Used During Session no - independent with all mobility        Sit/Stand Transfer    Surface chair with arm rests     [place] independent     Safety/Cues minimal;verbal cues;tactile cues;hand placement;technique;maintaining precautions     Assistive Device none     Transfer Comments from/to recliner 2x, performing WFL        Toilet Transfer    Transfer Technique sit/stand     [place] independent     Safety/Cues minimal;verbal cues;technique;maintaining precautions     Assistive Device none     Comment Pt rcv'd edu/training w/ visual demo in home-sim toilet transfer. Able to maintain NWB t/o. Pt performed WFL, receptive to edu/training.        Shower/Tub Transfer    Transfer Type Tub     Transfer Technique step over, left entry     [place] independent     Safety/Cues minimal;technique;maintaining precautions     Assistive Device none     Comment Pt rcv'd edu/training w/ visual demo in home-sim tub transfer. Able to maintain NWB t/o. Pt performed WFL, receptive to edu/training.        Functional Mobility    Distance in room/bathroom     Functional Mobility [place] independent     Assistive Device none     Functional Mobility Comments Pt performed func mob of HH distance w/ no AB. No LOB noted, safety awareness WFL. Rcv'd edu/training in incr awareness of surroundings d/t new sling and decr proprioception. Pt receptive to edu        Bathing    [place] other (see comments)     Safety/Cues compensatory techniques;selena/one-handed technique     Adaptive Equipment shower chair     Comment Pt rcv'd edu w/ visual demo of showering strategies while maintaining post-op shoulder precautions including: seated on SC, plastic covered pillow support when immobilizer removed, standing with hip flexion and affect limb hang. Pt rcv'd edu on the EC and safety benefits of using a SC during bathing. Pt receptive to edu.        Upper Body Dressing    Tasks doff;don;hospital  gown;pull over garment     [place] supervision     Safety/Cues moderate;verbal cues;tactile cues;maintaining precautions;problem-solving     Position supported sitting     Adaptive Equipment none     Comment Pt rc'vd edu/training w/ visual demo in UBD adaptive strategies (performed seated w/ pillow support, dress affected limb first with arm threading technique). Pt performing WFL to dress for home: Sling: Pt rcv'd edu/training in orthosis mgmt including doffing/donning techniques, wearing schedule, postioning, attending to any redness/irritation.        Lower Body Dressing    Tasks doff;don;socks;underwear;pants/bottoms;shoes/slippers     [place] supervision     Safety/Cues maintaining precautions     Position supported sitting     Adaptive Equipment none     Comment Pt javy'd func reach WFL during crossed leg technique req for LBD. Pt rcv'd edu w/ visual demo in LBD adaptive strategies while maintaining post-op precautions. Pt receptive to edu/training        Grooming    Comment offered, deferred        Toileting    Comment offered, deferred        Self-Feeding    Adaptive Equipment other (see comments)     Comment seated in recliner with tabletop set-up at end of session.        Balance    Static Sitting Balance WNL;sitting in chair     Dynamic Sitting Balance WNL;sitting in chair     Sit to Stand Dynamic Balance WFL;unsupported     Static Standing Balance WFL;unsupported     Dynamic Standing Balance WFL;unsupported     Balance Interventions occupation based/functional task     Comment, Balance no AD, no LOB        Impairments/Safety Issues    Impairments Affecting Function range of motion (ROM);pain        Upper Extremity (Therapeutic Exercise)    Exercise Position/Type supine     General Exercise right     Range of Motion Exercises right;shoulder flexion/extension;shoulder external/internal rotation     Comment Post-op PROM hand-out provided. Pt rcv'd edu/training on the following post-surgical RUE  PROM exercices: supine PROM shoulder flexion to 140 degrees, shoulder external rotation to 40 degrees. Pt educated on frequency and repetition amount. Handout provided.  Pt receptive to edu/training.                                    Education Documentation  Treatment Plan, taught by Yesika Henry OT at 8/1/2024  8:28 AM.  Learner: Patient  Readiness: Acceptance  Method: Explanation, Demonstration  Response: Verbalizes Understanding, Demonstrated Understanding  Comment: Edu/train: OT role, POC, home-sim transfers/func mob/BADLs/PROM exercises/sling care        Session Outcome  Patient upright, in chair at end of session, all needs met, call light in reach, personal items in reach. Nursing notified about change in vital signs, patient's performance, patient's position, and patient's response to therapy/activity.    AM-PAC™ - ADL (Current Function)     Putting on/taking off regular lower body clothing 4 - None   Bathing 3 - A Little   Toileting 4 - None   Putting on/taking off regular upper body clothing 3 - A Little   Help for taking care of personal grooming 4 - None   Eating meals 3 - A Little   AM-PAC™ ADL Score 21      ASSESSMENT AND PLAN     Progress Summary  OT eval complete. Pt is 55 yo s/p R rTSA post-op day 1. Pt rcv'd edu/training in home-sim transfers/ADLs/orthosis mgmt  & care while maintaining post-surgical precautions and rcv'd edu/training with handout and demonstration in RUE PROM exerscises. Pt is INDP for all OOB activity/func mobility. All OT edu/training complete and all questions answered.  At this time, OT rec d/c home with (A) as needed. d/c OT    Patient/Family Therapy Goal Statement: to go home safely    OT Plan      Flowsheet Row Most Recent Value   Rehab Potential other (see comments)  [d/c OT] at 08/01/2024 0805   Therapy Frequency evaluation only at 08/01/2024 0805   Planned Therapy Interventions other (see comments)  [all OT edu/training complete] at 08/01/2024 0805            OT  Discharge Recommendations      Flowsheet Row Most Recent Value   OT Recommended Discharge Disposition home, home with assistance at 08/01/2024 0805   Anticipated Equipment Needs if Discharged Home (OT) shower chair at 08/01/2024 0805

## 2024-08-01 NOTE — PLAN OF CARE
Care Coordination Admission Assessment Note    General Information:  Readmission Within the last 30 days: no previous admission in last 30 days  Does patient have a : No  Patient-Specific Goals (include timeframe): Return home    Living Arrangements:  Arrived From: home  Current Living Arrangements: home  People in Home: alone  Home Accessibility: stairs within home (Group)  Living Arrangement Comments: Pt resides alone in a 2SH w 2 RAAD.    Housing Stability and Utility Access (SDOH):  In the last 12 months, was there a time when you were not able to pay the mortgage or rent on time?: No  In the last 12 months, how many places have you lived?: 1  In the last 12 months, was there a time when you did not have a steady place to sleep or slept in a shelter (including now)?: No  In the past 12 months has the electric, gas, oil, or water company threatened to shut off services in your home?: No    Functional Status Prior to Admission:   Assistive Device/Animal Currently Used at Home: none  Functional Status Comments: Independent w mobility  IADL Comments: Independent w ADL's/IADL's. +     Supports and Services:  Current Outpatient/Agency/Support Group: none  Type of Current Home Care Services:    History of home care episode or rehab stay: No hx of HH/SNF/ARH    Discharge Needs Assessment:   Concerns to be Addressed: no discharge needs identified  Current Discharge Risk: physical impairment, lives alone  Anticipated Changes Related to Illness: none    Patient/Family Anticipated Discharge Plan:  Patient/Family Anticipates Transition To: home  Patient/Family Anticipated Services at Transition: none    Connection to Community  Not applicable    Patient Choice:   Offered/Gave Vendor List: no  Patient's Choice of Community Agency(s):         Anticipated Discharge Plan:  Met with patient. Provided education and contact information for Care Coordination services.: yes  Anticipated Discharge Disposition:  home with assistance     Transportation Needs (SDOH):  Transportation Concerns: none  Transportation Anticipated: family or friend will provide  Is Out of Hospital DNR needed at discharge?: no    In the past 12 months, has lack of transportation kept you from medical appointments or from getting medications?: No  In the past 12 months, has lack of transportation kept you from meetings, work, or from getting things needed for daily living?: No    Concerns - comments: S/p shoulder surgery. Cleared for home with assist. No dischrge needs likely.    SW met with patient at bedside. Patient confirmed address, PCP, pharmacy, and insurance. No home O2. No hx of HH. Friend to transport home. No current dc needs identified.     DCP  Home   Friend to transport home

## 2024-08-06 LAB
GRAM STN SPEC: ABNORMAL
MICROORGANISM SPEC CULT: ABNORMAL

## 2024-08-10 LAB
GRAM STN SPEC: ABNORMAL
MICROORGANISM SPEC CULT: ABNORMAL

## 2024-08-22 ENCOUNTER — EVALUATION (OUTPATIENT)
Dept: PHYSICAL THERAPY | Facility: CLINIC | Age: 54
End: 2024-08-22
Payer: COMMERCIAL

## 2024-08-22 DIAGNOSIS — M19.011 OSTEOARTHRITIS OF RIGHT SHOULDER, UNSPECIFIED OSTEOARTHRITIS TYPE: Primary | ICD-10-CM

## 2024-08-22 DIAGNOSIS — Z96.611 STATUS POST REVERSE TOTAL ARTHROPLASTY OF RIGHT SHOULDER: ICD-10-CM

## 2024-08-22 PROCEDURE — 97110 THERAPEUTIC EXERCISES: CPT

## 2024-08-22 PROCEDURE — 97140 MANUAL THERAPY 1/> REGIONS: CPT

## 2024-08-22 PROCEDURE — 97161 PT EVAL LOW COMPLEX 20 MIN: CPT

## 2024-08-22 NOTE — PROGRESS NOTES
PT Evaluation     Today's date: 2024  Patient name: Jean Elam  : 1970  MRN: [id number]  Referring provider: Thiago Bone MD  Dx:   Encounter Diagnosis     ICD-10-CM    1. Osteoarthritis of right shoulder, unspecified osteoarthritis type  M19.011       2. Status post reverse total arthroplasty of right shoulder  Z96.611           Start Time: 730  Stop Time: 08  Total time in clinic (min): 41 minutes    Assessment  Impairments: abnormal coordination, abnormal muscle firing, abnormal or restricted ROM, activity intolerance, impaired physical strength, lacks appropriate home exercise program, pain with function, weight-bearing intolerance and poor posture   Symptom irritability: high    Assessment details: Jean Elam is a pleasant 54 y.o. male who presents s/p R rTSA.  Movement diagnosis of R shoulder hypomobility with primary nociceptive pain.  He has limited R shoulder ROM, RUE weakness, poor posture and activity intolerance resulting in the pain he is experiencing.  No further referral appears necessary at this time based upon examination results.  I expect he will improve with progressive ROM and strengthening.  Positive prognostic indicators include good understanding of diagnosis and treatment plan options.  Negative prognostic indicators include chronicity of symptoms, depression, hypertension, high symptom irritability, and obesity.  Gave patient cane flexion, cane ER, pendulums, and elbow flexion for HEP.  Educated patient to avoid positions of IR, ADD, and extension to decrease risk of dislocation.  Patient would benefit from skilled PT services to address these deficits and return to PLOF.    Comparable signs:  1) R shoulder flexion ROM  2) R shoulder ABD ROM  Understanding of Dx/Px/POC: fair     Prognosis: fair    Goals  STGs  Patient will be independent with HEP in 4 weeks  Patient will decrease pain by 2 points in 4 weeks  LTGs  Patient will achieve 120* R shoulder flexion AROM  "in 12 weeks  Patient will achieve 110* R shoulder ABD AROM in 12 weeks  Patient will be able to sleep through the night without limitation in 12 weeks    Plan  Patient would benefit from: skilled physical therapy    Planned therapy interventions: home exercise program, therapeutic exercise, therapeutic activities, manual therapy, motor coordination training, neuromuscular re-education, patient education, strengthening, activity modification, body mechanics training, flexibility, functional ROM exercises, stretching and postural training    Frequency: 2x week  Duration in weeks: 12  Plan of Care beginning date: 2024  Plan of Care expiration date: 2024  Treatment plan discussed with: patient        Subjective Evaluation    History of Present Illness  Date of surgery: 2024  Mechanism of injury: Pain location: R shoulder-anterior and posterior aspects  Pain severity: 1-0-6  Aggs: sleeping, movement  Eases: ice, rest  Irritability: high  REGINALD/timeline: patient has complex history of R shoulder pain and limited mobility, about 7 total surgeries on it, developed RA 20 years old which exacerbated  symptoms, shoulder \"froze\" after 3rd surgery resulting in limit range for many years, 17 years ago patient had R TSA which was good for about 15 years, a year ago he started waking up with discomfort and symptoms worsened, rTSA performed on 26  Red flags: tingling in 4th and 5th digits  PMH/PSH: see above for complex surgical history  L Hand Dominant  Patient Goals  Patient goals for therapy: decreased pain  Patient goal: good night sleep  Pain  Current pain ratin  At best pain ratin  At worst pain ratin          Objective     Postural Observations  Seated posture: fair    Additional Postural Observation Details  Forward head, rounded shoulders    Cervical/Thoracic Screen   Cervical range of motion within normal limits with the following exceptions: Moderate limitation into cervical extension, all " other motions WNL    Neurological Testing     Sensation     Shoulder   Left Shoulder   Intact: light touch    Right Shoulder   Intact: Light touch    Reflexes   Left   Biceps (C5/C6): normal (2+)  Brachioradialis (C6): normal (2+)  Triceps (C7): normal (2+)    Right   Biceps (C5/C6): normal (2+)  Brachioradialis (C6): normal (2+)    Active Range of Motion   Left Shoulder   Normal active range of motion    Additional Active Range of Motion Details  R elbow flex/ext WNL  R pronation/supination WNL    Passive Range of Motion     Right Shoulder   Flexion: 90 degrees   Abduction: 70 degrees   External rotation 0°: 15 degrees     Strength/Myotome Testing     Additional Strength Details  Deferred at this time             Precautions: depression, HTN, RA  POC expiration: 11/14/24      Foto 8/22            Manuals 8/22            R shoulder PROM GS  10 min                                                   Neuro Re-Ed             Scap retraction                                                                                           Ther Ex             Cane ER HEP            Cane flexion HEP            Elbow flexion HEP            pendulums HEP                                      Pt edu GS- HEP, precautions            Recumbent bike             Ther Activity             Gripping                          Gait Training                                       Modalities

## 2024-08-22 NOTE — LETTER
2024    Thiago Bone MD  [address] [place] [id number]    Patient: Jean Elam   YOB: 1970   Date of Visit: 2024     Encounter Diagnosis     ICD-10-CM    1. Osteoarthritis of right shoulder, unspecified osteoarthritis type  M19.011       2. Status post reverse total arthroplasty of right shoulder  Z96.611           Dear Dr. Bone:    Thank you for your recent referral of Jean Elam. Please review the attached evaluation summary from Jean's recent visit.     Please verify that you agree with the plan of care by signing the attached order.     If you have any questions or concerns, please do not hesitate to call.     I sincerely appreciate the opportunity to share in the care of one of your patients and hope to have another opportunity to work with you in the near future.       Sincerely,    Miguel Luke, PT      Referring Provider:      I certify that I have read the below Plan of Care and certify the need for these services furnished under this plan of treatment while under my care.                    Thigao Bone MD  [address] [place] [id number]  Via Fax: [phone]          PT Evaluation     Today's date: 2024  Patient name: Jean Elam  : 1970  MRN: [id number]  Referring provider: Thiago Bone MD  Dx:   Encounter Diagnosis     ICD-10-CM    1. Osteoarthritis of right shoulder, unspecified osteoarthritis type  M19.011       2. Status post reverse total arthroplasty of right shoulder  Z96.611           Start Time: 730  Stop Time: 08  Total time in clinic (min): 41 minutes    Assessment  Impairments: abnormal coordination, abnormal muscle firing, abnormal or restricted ROM, activity intolerance, impaired physical strength, lacks appropriate home exercise program, pain with function, weight-bearing intolerance and poor posture   Symptom irritability: high    Assessment details: Jean Elam is a pleasant 54 y.o.  male who presents s/p R rTSA.  Movement diagnosis of R shoulder hypomobility with primary nociceptive pain.  He has limited R shoulder ROM, RUE weakness, poor posture and activity intolerance resulting in the pain he is experiencing.  No further referral appears necessary at this time based upon examination results.  I expect he will improve with progressive ROM and strengthening.  Positive prognostic indicators include good understanding of diagnosis and treatment plan options.  Negative prognostic indicators include chronicity of symptoms, depression, hypertension, high symptom irritability, and obesity.  Gave patient cane flexion, cane ER, pendulums, and elbow flexion for HEP.  Educated patient to avoid positions of IR, ADD, and extension to decrease risk of dislocation.  Patient would benefit from skilled PT services to address these deficits and return to PLOF.    Comparable signs:  1) R shoulder flexion ROM  2) R shoulder ABD ROM  Understanding of Dx/Px/POC: fair     Prognosis: fair    Goals  STGs  Patient will be independent with HEP in 4 weeks  Patient will decrease pain by 2 points in 4 weeks  LTGs  Patient will achieve 120* R shoulder flexion AROM in 12 weeks  Patient will achieve 110* R shoulder ABD AROM in 12 weeks  Patient will be able to sleep through the night without limitation in 12 weeks    Plan  Patient would benefit from: skilled physical therapy    Planned therapy interventions: home exercise program, therapeutic exercise, therapeutic activities, manual therapy, motor coordination training, neuromuscular re-education, patient education, strengthening, activity modification, body mechanics training, flexibility, functional ROM exercises, stretching and postural training    Frequency: 2x week  Duration in weeks: 12  Plan of Care beginning date: 8/22/2024  Plan of Care expiration date: 11/14/2024  Treatment plan discussed with: patient        Subjective Evaluation    History of Present  "Illness  Date of surgery: 2024  Mechanism of injury: Pain location: R shoulder-anterior and posterior aspects  Pain severity: 1-0-6  Aggs: sleeping, movement  Eases: ice, rest  Irritability: high  REGINALD/timeline: patient has complex history of R shoulder pain and limited mobility, about 7 total surgeries on it, developed RA 20 years old which exacerbated  symptoms, shoulder \"froze\" after 3rd surgery resulting in limit range for many years, 17 years ago patient had R TSA which was good for about 15 years, a year ago he started waking up with discomfort and symptoms worsened, rTSA performed on 24  Red flags: tingling in 4th and 5th digits  PMH/PSH: see above for complex surgical history  L Hand Dominant  Patient Goals  Patient goals for therapy: decreased pain  Patient goal: good night sleep  Pain  Current pain ratin  At best pain ratin  At worst pain ratin          Objective     Postural Observations  Seated posture: fair    Additional Postural Observation Details  Forward head, rounded shoulders    Cervical/Thoracic Screen   Cervical range of motion within normal limits with the following exceptions: Moderate limitation into cervical extension, all other motions WNL    Neurological Testing     Sensation     Shoulder   Left Shoulder   Intact: light touch    Right Shoulder   Intact: Light touch    Reflexes   Left   Biceps (C5/C6): normal (2+)  Brachioradialis (C6): normal (2+)  Triceps (C7): normal (2+)    Right   Biceps (C5/C6): normal (2+)  Brachioradialis (C6): normal (2+)    Active Range of Motion   Left Shoulder   Normal active range of motion    Additional Active Range of Motion Details  R elbow flex/ext WNL  R pronation/supination WNL    Passive Range of Motion     Right Shoulder   Flexion: 90 degrees   Abduction: 70 degrees   External rotation 0°: 15 degrees     Strength/Myotome Testing     Additional Strength Details  Deferred at this time             Precautions: depression, HTN, RA  POC " expiration: 11/14/24      Foto 8/22            Manuals 8/22            R shoulder PROM GS  10 min                                                   Neuro Re-Ed             Scap retraction                                                                                           Ther Ex             Cane ER HEP            Cane flexion HEP            Elbow flexion HEP            pendulums HEP                                      Pt edu GS- HEP, precautions            Recumbent bike             Ther Activity             Gripping                          Gait Training                                       Modalities

## 2024-08-28 ENCOUNTER — OFFICE VISIT (OUTPATIENT)
Dept: PHYSICAL THERAPY | Facility: CLINIC | Age: 54
End: 2024-08-28
Payer: COMMERCIAL

## 2024-08-28 DIAGNOSIS — Z96.611 STATUS POST REVERSE TOTAL ARTHROPLASTY OF RIGHT SHOULDER: ICD-10-CM

## 2024-08-28 DIAGNOSIS — M19.011 OSTEOARTHRITIS OF RIGHT SHOULDER, UNSPECIFIED OSTEOARTHRITIS TYPE: Primary | ICD-10-CM

## 2024-08-28 LAB
FUNGUS SPEC CULT: NORMAL

## 2024-08-28 PROCEDURE — 97110 THERAPEUTIC EXERCISES: CPT

## 2024-08-28 PROCEDURE — 97140 MANUAL THERAPY 1/> REGIONS: CPT

## 2024-08-28 NOTE — PROGRESS NOTES
Daily Note     Today's date: 2024  Patient name: Jean Elam  : 1970  MRN: [id number]  Referring provider: Thiago Bone MD  Dx:   Encounter Diagnosis     ICD-10-CM    1. Osteoarthritis of right shoulder, unspecified osteoarthritis type  M19.011       2. Status post reverse total arthroplasty of right shoulder  Z96.611           Start Time: 1415  Stop Time: 1445  Total time in clinic (min): 30 minutes    Subjective: Patient reports that his shoulder hasn't been as painful as he expected.  New symptoms/problems include none.  Patient reports increased pain/soreness after last session.  HEP/ Activity Recommendations:  progressing with AAROM  Progress Towards Goals: able to reach the faucet now      Objective: See treatment diary below  R shoulder AAROM [id number]*      Assessment: Patient demonstrates improving R shoulder PROM this session.  Patient completed today's session with increase in pain at end ranges of motion.  Focus is on mobility in all planes at this time.  Firm end feel with PROM.  Many years of stiffness limits prognosis for patient to be able to achieve typical ROM following rTSA.  Future sessions should continue to progress ROM exercises as able. Tolerated treatment fair. Patient would benefit from continued PT.      Plan: Continue per plan of care.      Precautions: depression, HTN, RA  POC expiration: 24      Foto             Manuals            R shoulder PROM GS  10 min GS  15 min                                                  Neuro Re-Ed             Scap retraction                                                                                           Ther Ex             Cane ER HEP X20           Cane flexion HEP x20           Elbow flexion HEP x20           pendulums HEP x15                                     Pt edu GS- HEP, precautions            Recumbent bike             Ther Activity             Gripping                          Gait Training                                        Modalities

## 2024-08-30 ENCOUNTER — OFFICE VISIT (OUTPATIENT)
Dept: PHYSICAL THERAPY | Facility: CLINIC | Age: 54
End: 2024-08-30
Payer: COMMERCIAL

## 2024-08-30 DIAGNOSIS — M19.011 OSTEOARTHRITIS OF RIGHT SHOULDER, UNSPECIFIED OSTEOARTHRITIS TYPE: Primary | ICD-10-CM

## 2024-08-30 DIAGNOSIS — Z96.611 STATUS POST REVERSE TOTAL ARTHROPLASTY OF RIGHT SHOULDER: ICD-10-CM

## 2024-08-30 PROCEDURE — 97140 MANUAL THERAPY 1/> REGIONS: CPT

## 2024-08-30 PROCEDURE — 97110 THERAPEUTIC EXERCISES: CPT

## 2024-08-30 NOTE — PROGRESS NOTES
Daily Note     Today's date: 2024  Patient name: Jean Elam  : 1970  MRN: [id number]  Referring provider: hTiago Bone MD  Dx:   Encounter Diagnosis     ICD-10-CM    1. Osteoarthritis of right shoulder, unspecified osteoarthritis type  M19.011       2. Status post reverse total arthroplasty of right shoulder  Z96.611           Start Time: 1514  Stop Time: 1553  Total time in clinic (min): 39 minutes    Subjective: Patient reports that his shoulder is stiff today and doesn't know why.  New symptoms/problems include increased stiffness.  Patient reports none pain/soreness after last session.  HEP/ Activity Recommendations:  performing stretching despite pain/stiffness  Progress Towards Goals: none      Objective: See treatment diary below      Assessment: Patient demonstrates decreased R shoulder mobility today compared to last session.  Patient completed today's session without increase in pain.  Emphasized the importance of continuing to keep up with stretching and ROM exercises.  Currently performing HEP 4x/day- not progressing frequency to allow pain time to reduce.  Introduced pulleys in order to progress motion with active assistance.  Future sessions should continue to progress mobility exercises as able. Tolerated treatment fair. Patient would benefit from continued PT.      Plan: Continue per plan of care.      Precautions: depression, HTN, RA  POC expiration: 24      Foto             Manuals           R shoulder PROM GS  10 min GS  15 min GS  15 min                                                 Neuro Re-Ed             Scap retraction                                                                                           Ther Ex             Cane ER HEP X20 x15          Cane flexion HEP x20 x15          Elbow flexion HEP x20 x20          pendulums HEP x15 x15          Pulleys   x20                       Pt edu GS- HEP, precautions            Recumbent bike              Ther Activity             Gripping                          Gait Training                                       Modalities

## 2024-09-03 ENCOUNTER — OFFICE VISIT (OUTPATIENT)
Dept: PHYSICAL THERAPY | Facility: CLINIC | Age: 54
End: 2024-09-03
Payer: COMMERCIAL

## 2024-09-03 DIAGNOSIS — M19.011 OSTEOARTHRITIS OF RIGHT SHOULDER, UNSPECIFIED OSTEOARTHRITIS TYPE: Primary | ICD-10-CM

## 2024-09-03 DIAGNOSIS — Z96.611 STATUS POST REVERSE TOTAL ARTHROPLASTY OF RIGHT SHOULDER: ICD-10-CM

## 2024-09-03 PROCEDURE — 97140 MANUAL THERAPY 1/> REGIONS: CPT

## 2024-09-03 PROCEDURE — 97110 THERAPEUTIC EXERCISES: CPT

## 2024-09-03 NOTE — PROGRESS NOTES
Daily Note     Today's date: 9/3/2024  Patient name: Jean Elam  : 1970  MRN: [id number]  Referring provider: Thiago Bone MD  Dx:   Encounter Diagnosis     ICD-10-CM    1. Osteoarthritis of right shoulder, unspecified osteoarthritis type  M19.011       2. Status post reverse total arthroplasty of right shoulder  Z96.611           Start Time: 1232  Stop Time: 1305  Total time in clinic (min): 33 minutes    Subjective: Patient reports that his shoulder is stiff today but not as stiff as the last PT session.  He had a horrible night sleeping on  night as he just couldn't get comfortable, but last night was better.  New symptoms/problems include none.  Patient reports no significant pain/soreness after last session.  HEP/ Activity Recommendations:  has been consistent with ROM exercises for HEP  Progress Towards Goals: none at this time        Objective: See treatment diary below      Assessment: Patient demonstrates improving R shoulder PROM this session.  Patient completed today's session with increase in pain at end ranges of stretching.  Heavy focus on manual stretching today. End feel is firm due to prolonged history of limited ROM.  Future sessions should continue to progress ROM as able. Tolerated treatment fair. Patient would benefit from continued PT.      Plan: Continue per plan of care.      Precautions: depression, HTN, RA  POC expiration: 24      Foto             Manuals 8/22 8/28 8/30 9/3         R shoulder PROM GS  10 min GS  15 min GS  15 min GS  15 min                                                Neuro Re-Ed             Scap retraction                                                                                           Ther Ex             Cane ER HEP X20 x15 x15         Cane flexion HEP x20 x15 x15         Elbow flexion HEP x20 x20 x20         pendulums HEP x15 x15 x15         Pulleys   x20 2x10                      Pt edu GS- HEP, precautions            Recumbent  bike             Ther Activity             Gripping                          Gait Training                                       Modalities

## 2024-09-05 ENCOUNTER — OFFICE VISIT (OUTPATIENT)
Dept: PHYSICAL THERAPY | Facility: CLINIC | Age: 54
End: 2024-09-05
Payer: COMMERCIAL

## 2024-09-05 DIAGNOSIS — Z96.611 STATUS POST REVERSE TOTAL ARTHROPLASTY OF RIGHT SHOULDER: ICD-10-CM

## 2024-09-05 DIAGNOSIS — M19.011 OSTEOARTHRITIS OF RIGHT SHOULDER, UNSPECIFIED OSTEOARTHRITIS TYPE: Primary | ICD-10-CM

## 2024-09-05 PROCEDURE — 97140 MANUAL THERAPY 1/> REGIONS: CPT

## 2024-09-05 PROCEDURE — 97110 THERAPEUTIC EXERCISES: CPT

## 2024-09-05 NOTE — PROGRESS NOTES
Daily Note     Today's date: 2024  Patient name: Jean Elam  : 1970  MRN: [id number]  Referring provider: Thiago Bone MD  Dx:   Encounter Diagnosis     ICD-10-CM    1. Osteoarthritis of right shoulder, unspecified osteoarthritis type  M19.011       2. Status post reverse total arthroplasty of right shoulder  Z96.611           Start Time: 1230  Stop Time: 1300  Total time in clinic (min): 30 minutes    Subjective: Patient reports that his shoulder remains stiff but has noticed some improvements in little things.  New symptoms/problems include none.  Patient reports no pain/soreness after last session.  HEP/ Activity Recommendations:  performing several times a day to prevent as much stiffness as possible  Progress Towards Goals: able to reach L arm to scratch it      Objective: See treatment diary below  R shoulder flexion PROM=110*  R shoulder ABD PROM=95*  R shoulder ER PROM=35*      Assessment: Patient demonstrates improving R shoulder PROM this session.  Patient completed today's session without increase in pain.  Pulleys performed in order to improve R shoulder AAROM.  Continued with PROM stretching to combat shoulder stiffness in attempt to prevent freezing.  Patient has pulleys at home and will begin to incorporate as able.  Future sessions should continue to progress ROM and initiate gentle AROM against gravity as able. Tolerated treatment fair. Patient would benefit from continued PT.      Plan: Continue per plan of care.      Precautions: depression, HTN, RA  POC expiration: 24      Foto             Manuals  9/3 9/        R shoulder PROM GS  10 min GS  15 min GS  15 min GS  15 min GS  15 min                                               Neuro Re-Ed             Scap retraction                                                                                           Ther Ex             Cane ER HEP X20 x15 x15 x20        Cane flexion HEP x20 x15 x15 x20         Elbow flexion HEP x20 x20 x20 x20        pendulums HEP x15 x15 x15 x20        Pulleys   x20 2x10 2x10                     Pt edu GS- HEP, precautions            Recumbent bike             Ther Activity             Gripping                          Gait Training                                       Modalities

## 2024-09-10 ENCOUNTER — OFFICE VISIT (OUTPATIENT)
Dept: PHYSICAL THERAPY | Facility: CLINIC | Age: 54
End: 2024-09-10
Payer: COMMERCIAL

## 2024-09-10 DIAGNOSIS — Z96.611 STATUS POST REVERSE TOTAL ARTHROPLASTY OF RIGHT SHOULDER: ICD-10-CM

## 2024-09-10 DIAGNOSIS — M19.011 OSTEOARTHRITIS OF RIGHT SHOULDER, UNSPECIFIED OSTEOARTHRITIS TYPE: Primary | ICD-10-CM

## 2024-09-10 PROCEDURE — 97140 MANUAL THERAPY 1/> REGIONS: CPT

## 2024-09-10 PROCEDURE — 97110 THERAPEUTIC EXERCISES: CPT

## 2024-09-10 NOTE — PROGRESS NOTES
Daily Note     Today's date: 9/10/2024  Patient name: Jean Elam  : 1970  MRN: [id number]  Referring provider: Thiago Bone MD  Dx:   Encounter Diagnosis     ICD-10-CM    1. Osteoarthritis of right shoulder, unspecified osteoarthritis type  M19.011       2. Status post reverse total arthroplasty of right shoulder  Z96.611           Start Time: 1215  Stop Time: 1257  Total time in clinic (min): 42 minutes    Subjective: Patient reports that his shoulder feels a lot better than last week.  New symptoms/problems include none.  Patient reports no pain/soreness after last session.  HEP/ Activity Recommendations:  continuing with stretching exercises  Progress Towards Goals: able to use both arms to put a sock on now      Objective: See treatment diary below      Assessment: Patient demonstrates improving pain levels in R shoulder this session.  Patient completed today's session with increase in pain at end range of PROM.  Pulleys performed in order improve AAROM.  UBE performed in order to improve UE muscular endurance.  As pain levels are decreasing, we will begin to transition to AAROM against gravity.  Added standing cane ABD for HEP.  Future sessions should continue to progress ROM and stretching exercises as able. Tolerated treatment fair. Patient would benefit from continued PT.      Plan: Continue per plan of care.      Precautions: depression, HTN, RA  POC expiration: 24      Foto             Manuals  9/3 9/5 9/10       R shoulder PROM GS  10 min GS  15 min GS  15 min GS  15 min GS  15 min GS  15 min                                              Neuro Re-Ed             Scap retraction                                                                                           Ther Ex             Cane ER HEP X20 x15 x15 x20 x20       Cane flexion HEP x20 x15 x15 x20 x20       Elbow flexion HEP x20 x20 x20 x20 x20       pendulums HEP x15 x15 x15 x20 x20       Pulleys   x20 2x10  2x10 2x10       Standing cane ABD      x20       Pt edu GS- HEP, precautions            UBE      2' forward       Ther Activity             Gripping                          Gait Training                                       Modalities

## 2024-09-13 ENCOUNTER — OFFICE VISIT (OUTPATIENT)
Dept: PHYSICAL THERAPY | Facility: CLINIC | Age: 54
End: 2024-09-13
Payer: COMMERCIAL

## 2024-09-13 DIAGNOSIS — M19.011 OSTEOARTHRITIS OF RIGHT SHOULDER, UNSPECIFIED OSTEOARTHRITIS TYPE: Primary | ICD-10-CM

## 2024-09-13 DIAGNOSIS — Z96.611 STATUS POST REVERSE TOTAL ARTHROPLASTY OF RIGHT SHOULDER: ICD-10-CM

## 2024-09-13 PROCEDURE — 97110 THERAPEUTIC EXERCISES: CPT

## 2024-09-13 PROCEDURE — 97140 MANUAL THERAPY 1/> REGIONS: CPT

## 2024-09-13 NOTE — PROGRESS NOTES
Daily Note     Today's date: 2024  Patient name: Jean Elam  : 1970  MRN: [id number]  Referring provider: Thiago Bone MD  Dx:   Encounter Diagnosis     ICD-10-CM    1. Osteoarthritis of right shoulder, unspecified osteoarthritis type  M19.011       2. Status post reverse total arthroplasty of right shoulder  Z96.611           Start Time: 1025  Stop Time: 1114  Total time in clinic (min): 49 minutes    Subjective: Patient reports that he saw Dr. Bone yesterday and he was pleased with progress thus far.  Pain levels continue to be low overall.  New symptoms/problems include none.  Patient reports no pain/soreness after last session.  HEP/ Activity Recommendations:  continues to perform as prescribed  Progress Towards Goals: sleeping a little better      Objective: See treatment diary below      Assessment: Patient demonstrates improving R shoulder AAROM this session.  Patient completed today's session with significant increase in pain when attempting wall slides.  Stopped exercise due to high pain levels.  UBE performed in order to improve UE muscular endurance.  Progressed AAROM with addition of table slides.  Strengthening below shoulder height tolerated well.  Strength deficits limit patient's ability to access his available ROM.  Future sessions should continue to progress AAROM and stretching as able. Tolerated treatment well. Patient would benefit from continued PT.        Plan: Continue per plan of care.      Precautions: depression, HTN, RA  POC expiration: 24      Foto             Manuals  9/3 9/ 9/10 9/13      R shoulder PROM GS  10 min GS  15 min GS  15 min GS  15 min GS  15 min GS  15 min GS  10 min      Cupping                                       Neuro Re-Ed             Scap retraction                                                                                           Ther Ex             Cane ER HEP X20 x15 x15 x20 x20 x20      Cane flexion HEP  x20 x15 x15 x20 x20 x20      Elbow flexion HEP x20 x20 x20 x20 x20 X20  3#      pendulums HEP x15 x15 x15 x20 x20       Pulleys   x20 2x10 2x10 2x10 2x10      Standing cane ABD      x20 x20      Wall slide       unable      Table slide- flex, ABD       x20      TB row       X20  otb      TB ext       X20  otb      Pt edu GS- HEP, precautions            UBE      2' forward 2'2'      Ther Activity             Gripping                          Gait Training                                       Modalities

## 2024-09-16 ENCOUNTER — OFFICE VISIT (OUTPATIENT)
Dept: PHYSICAL THERAPY | Facility: CLINIC | Age: 54
End: 2024-09-16
Payer: COMMERCIAL

## 2024-09-16 DIAGNOSIS — Z96.611 STATUS POST REVERSE TOTAL ARTHROPLASTY OF RIGHT SHOULDER: ICD-10-CM

## 2024-09-16 DIAGNOSIS — M19.011 OSTEOARTHRITIS OF RIGHT SHOULDER, UNSPECIFIED OSTEOARTHRITIS TYPE: Primary | ICD-10-CM

## 2024-09-16 PROCEDURE — 97140 MANUAL THERAPY 1/> REGIONS: CPT

## 2024-09-16 PROCEDURE — 97110 THERAPEUTIC EXERCISES: CPT

## 2024-09-16 NOTE — PROGRESS NOTES
Daily Note     Today's date: 2024  Patient name: Jean Elam  : 1970  MRN: [id number]  Referring provider: Thiago Bone MD  Dx:   Encounter Diagnosis     ICD-10-CM    1. Osteoarthritis of right shoulder, unspecified osteoarthritis type  M19.011       2. Status post reverse total arthroplasty of right shoulder  Z96.611           Start Time: 1115  Stop Time: 1202  Total time in clinic (min): 47 minutes    Subjective: Patient reports that his L shoulder is bothering him more today than last week.  New symptoms/problems include nonpainful popping in superior aspect of L shoulder with table slides.  Patient reports no pain/soreness after last session.  HEP/ Activity Recommendations:  may have overdone it over the weekend with his stretches resulting in some increased pain today  Progress Towards Goals: forward flexion is improving as patient is able to turn on faucet now      Objective: See treatment diary below      Assessment: Patient demonstrates improving flexion  AROM this session.  Patient completed today's session with increase in pain at end ranges.  UBE performed in order to improve UE muscular endurance.  ROM exercises should continue in order to prevent shoulder from freezing.  Biceps fatigued with current strengthening exercises.  Future sessions should continue to progress ROM exercises as able. Tolerated treatment well. Patient would benefit from continued PT.      Plan: Continue per plan of care.      Precautions: depression, HTN, RA  POC expiration: 24      Foto             Manuals  9/3 9/ 9/10 9/13 16     R shoulder PROM GS  10 min GS  15 min GS  15 min GS  15 min GS  15 min GS  15 min GS  10 min GS  10 min     Cupping                                       Neuro Re-Ed             Scap retraction                                                                                           Ther Ex             Cane ER HEP X20 x15 x15 x20 x20 x20 x20     Cane  flexion HEP x20 x15 x15 x20 x20 x20 x20     Elbow flexion HEP x20 x20 x20 x20 x20 X20  3# X20  3#  Supinated, hammer     pendulums HEP x15 x15 x15 x20 x20  x20     Pulleys   x20 2x10 2x10 2x10 2x10 2x10     Standing cane ABD      x20 x20 x20     Wall slide       unable      Table slide- flex, ABD       x20 x20     TB row       X20  otb X20  otb     TB ext       X20  otb X20  otb     Pt edu GS- HEP, precautions            UBE      2' forward 2'2' 2'2'     Ther Activity             Gripping                          Gait Training                                       Modalities

## 2024-09-20 ENCOUNTER — OFFICE VISIT (OUTPATIENT)
Dept: PHYSICAL THERAPY | Facility: CLINIC | Age: 54
End: 2024-09-20
Payer: COMMERCIAL

## 2024-09-20 DIAGNOSIS — M19.011 OSTEOARTHRITIS OF RIGHT SHOULDER, UNSPECIFIED OSTEOARTHRITIS TYPE: Primary | ICD-10-CM

## 2024-09-20 DIAGNOSIS — Z96.611 STATUS POST REVERSE TOTAL ARTHROPLASTY OF RIGHT SHOULDER: ICD-10-CM

## 2024-09-20 PROCEDURE — 97110 THERAPEUTIC EXERCISES: CPT

## 2024-09-20 PROCEDURE — 97140 MANUAL THERAPY 1/> REGIONS: CPT

## 2024-09-20 NOTE — PROGRESS NOTES
Daily Note     Today's date: 2024  Patient name: Jean Elam  : 1970  MRN: [id number]  Referring provider: Thiago Bone MD  Dx:   Encounter Diagnosis     ICD-10-CM    1. Osteoarthritis of right shoulder, unspecified osteoarthritis type  M19.011       2. Status post reverse total arthroplasty of right shoulder  Z96.611           Start Time: 901  Stop Time: 945  Total time in clinic (min): 44 minutes    Subjective: Patient reports that his shoulder is pretty sore today after he had to scrub his floors yesterday.  New symptoms/problems include none.  Patient reports no pain/soreness after last session.  HEP/ Activity Recommendations:  no improvements with ROM, but has been able to maintain current range  Progress Towards Goals: able to do some house cleaning for the first time yesterday      Objective: See treatment diary below      Assessment: Patient demonstrates improving R shoulder AROM this session.  Patient completed today's session without increase in pain.  UBE performed in order to improve UE muscular endurance.  Initiated gentle AROM in standing against gravity in order to begin strengthening deltoid.  Overall tolerance is improving as patient is able to perform 20 repetitions on pulleys without needing rest.  Future sessions should continue to progress AROM with strengthening as able. Tolerated treatment well. Patient would benefit from continued PT.      Plan: Continue per plan of care.      Precautions: depression, HTN, RA  POC expiration: 24      Foto             Manuals  9/3 9/5 9/10 913     R shoulder PROM GS  10 min GS  15 min GS  15 min GS  15 min GS  15 min GS  15 min GS  10 min GS  10 min GS  10 min    Cupping                                       Neuro Re-Ed             Scap retraction                                                                                           Ther Ex             Cane ER HEP X20 x15 x15 x20 x20 x20 x20 x20     Cane flexion HEP x20 x15 x15 x20 x20 x20 x20 x20    Elbow flexion HEP x20 x20 x20 x20 x20 X20  3# X20  3#  Supinated, hammer X20  5# supinated, hammer    pendulums HEP x15 x15 x15 x20 x20  x20 x20    Pulleys   x20 2x10 2x10 2x10 2x10 2x10 x20    Standing cane ABD      x20 x20 x20 x20    Wall slide       unable      Table slide- flex, ABD       x20 x20 x20    TB row       X20  otb X20  otb X20  gtb    TB ext       X20  otb X20  otb X20  gtb    SL AROM             Standing AROM- flex, ABD         X10 ea    Pt edu GS- HEP, precautions            UBE      2' forward 2'2' 2'2' 3'3'    Ther Activity             Gripping                          Gait Training                                       Modalities

## 2024-09-23 ENCOUNTER — OFFICE VISIT (OUTPATIENT)
Dept: PHYSICAL THERAPY | Facility: CLINIC | Age: 54
End: 2024-09-23
Payer: COMMERCIAL

## 2024-09-23 ENCOUNTER — LAB REQUISITION (OUTPATIENT)
Dept: LAB | Facility: HOSPITAL | Age: 54
End: 2024-09-23
Payer: COMMERCIAL

## 2024-09-23 DIAGNOSIS — Z96.611 STATUS POST REVERSE TOTAL ARTHROPLASTY OF RIGHT SHOULDER: ICD-10-CM

## 2024-09-23 DIAGNOSIS — M19.011 OSTEOARTHRITIS OF RIGHT SHOULDER, UNSPECIFIED OSTEOARTHRITIS TYPE: Primary | ICD-10-CM

## 2024-09-23 DIAGNOSIS — M19.011 PRIMARY OSTEOARTHRITIS, RIGHT SHOULDER: ICD-10-CM

## 2024-09-23 LAB
ALBUMIN SERPL BCG-MCNC: 4.3 G/DL (ref 3.5–5)
ALP SERPL-CCNC: 78 U/L (ref 34–104)
ALT SERPL W P-5'-P-CCNC: 27 U/L (ref 7–52)
AST SERPL W P-5'-P-CCNC: 21 U/L (ref 13–39)
BASOPHILS # BLD AUTO: 0.05 THOUSANDS/ΜL (ref 0–0.1)
BASOPHILS NFR BLD AUTO: 1 % (ref 0–1)
BILIRUB DIRECT SERPL-MCNC: 0.03 MG/DL (ref 0–0.2)
BILIRUB SERPL-MCNC: 0.41 MG/DL (ref 0.2–1)
CREAT SERPL-MCNC: 0.94 MG/DL (ref 0.6–1.3)
CRP SERPL QL: <1 MG/L
EOSINOPHIL # BLD AUTO: 0.59 THOUSAND/ΜL (ref 0–0.61)
EOSINOPHIL NFR BLD AUTO: 10 % (ref 0–6)
ERYTHROCYTE [DISTWIDTH] IN BLOOD BY AUTOMATED COUNT: 12.5 % (ref 11.6–15.1)
ERYTHROCYTE [SEDIMENTATION RATE] IN BLOOD: 2 MM/HOUR (ref 0–19)
GFR SERPL CREATININE-BSD FRML MDRD: 92 ML/MIN/1.73SQ M
HCT VFR BLD AUTO: 37.9 % (ref 36.5–49.3)
HGB BLD-MCNC: 13 G/DL (ref 12–17)
IMM GRANULOCYTES # BLD AUTO: 0.03 THOUSAND/UL (ref 0–0.2)
IMM GRANULOCYTES NFR BLD AUTO: 1 % (ref 0–2)
LYMPHOCYTES # BLD AUTO: 1.99 THOUSANDS/ΜL (ref 0.6–4.47)
LYMPHOCYTES NFR BLD AUTO: 34 % (ref 14–44)
MCH RBC QN AUTO: 29 PG (ref 26.8–34.3)
MCHC RBC AUTO-ENTMCNC: 34.3 G/DL (ref 31.4–37.4)
MCV RBC AUTO: 84 FL (ref 82–98)
MONOCYTES # BLD AUTO: 0.52 THOUSAND/ΜL (ref 0.17–1.22)
MONOCYTES NFR BLD AUTO: 9 % (ref 4–12)
NEUTROPHILS # BLD AUTO: 2.76 THOUSANDS/ΜL (ref 1.85–7.62)
NEUTS SEG NFR BLD AUTO: 45 % (ref 43–75)
NRBC BLD AUTO-RTO: 0 /100 WBCS
PLATELET # BLD AUTO: 225 THOUSANDS/UL (ref 149–390)
PMV BLD AUTO: 10.4 FL (ref 8.9–12.7)
PROT SERPL-MCNC: 6.8 G/DL (ref 6.4–8.4)
RBC # BLD AUTO: 4.49 MILLION/UL (ref 3.88–5.62)
WBC # BLD AUTO: 5.94 THOUSAND/UL (ref 4.31–10.16)

## 2024-09-23 PROCEDURE — 85025 COMPLETE CBC W/AUTO DIFF WBC: CPT | Performed by: NURSE PRACTITIONER

## 2024-09-23 PROCEDURE — 80076 HEPATIC FUNCTION PANEL: CPT | Performed by: NURSE PRACTITIONER

## 2024-09-23 PROCEDURE — 97140 MANUAL THERAPY 1/> REGIONS: CPT

## 2024-09-23 PROCEDURE — 86140 C-REACTIVE PROTEIN: CPT | Performed by: NURSE PRACTITIONER

## 2024-09-23 PROCEDURE — 85652 RBC SED RATE AUTOMATED: CPT | Performed by: NURSE PRACTITIONER

## 2024-09-23 PROCEDURE — 82565 ASSAY OF CREATININE: CPT | Performed by: NURSE PRACTITIONER

## 2024-09-23 PROCEDURE — 97110 THERAPEUTIC EXERCISES: CPT

## 2024-09-23 NOTE — PROGRESS NOTES
Daily Note     Today's date: 2024  Patient name: Jean Elam  : 1970  MRN: [id number]  Referring provider: Thiago Bone MD  Dx:   Encounter Diagnosis     ICD-10-CM    1. Osteoarthritis of right shoulder, unspecified osteoarthritis type  M19.011       2. Status post reverse total arthroplasty of right shoulder  Z96.611           Start Time: 1115  Stop Time: 1159  Total time in clinic (min): 44 minutes    Subjective: Patient reports that his shoulder pain has been better since last week.  New symptoms/problems include none.  Patient reports no pain/soreness after last session.  HEP/ Activity Recommendations:  no new updates  Progress Towards Goals: no new updates      Objective: See treatment diary below      Assessment: Patient demonstrates consistent ROM this session.  Patient completed today's session without increase in pain.  UBE performed in order to improve UE muscular endurance. Continuing with ROM training in order to maintain current range, further improvements are unlikely with given history of limited range.  Improved AROM shoulder flexion today compared to last session, with significant R shoulder hike.  Future sessions should continue to progress AROM against gravity as able. Tolerated treatment well. Patient would benefit from continued PT.      Plan: Continue per plan of care.      Precautions: depression, HTN, RA  POC expiration: 24      Foto             Manuals  9/3 9/5 9/10 9/13 9/16 9/20 9/23   R shoulder PROM GS  10 min GS  15 min GS  15 min GS  15 min GS  15 min GS  15 min GS  10 min GS  10 min GS  10 min GS  10 min   Cupping                                       Neuro Re-Ed             Scap retraction                                                                                           Ther Ex             Cane ER HEP X20 x15 x15 x20 x20 x20 x20 x20 x20   Cane flexion HEP x20 x15 x15 x20 x20 x20 x20 x20 x20   Elbow flexion HEP x20 x20 x20 x20 x20  X20  3# X20  3#  Supinated, hammer X20  5# supinated, hammer X20  5# supinated, hammer   pendulums HEP x15 x15 x15 x20 x20  x20 x20 x20   Pulleys   x20 2x10 2x10 2x10 2x10 2x10 x20 x20   Standing cane ABD      x20 x20 x20 x20    Wall slide       unable      Table slide- flex, ABD       x20 x20 x20 x20   TB row       X20  otb X20  otb X20  gtb X20  btb   TB ext       X20  otb X20  otb X20  gtb X20  btb   SL AROM             Standing AROM- flex, ABD         X10 ea X20 ea   Pt edu GS- HEP, precautions            UBE      2' forward 2'2' 2'2' 3'3' 2'2'   Ther Activity             Gripping                          Gait Training                                       Modalities

## 2024-09-26 ENCOUNTER — OFFICE VISIT (OUTPATIENT)
Dept: PHYSICAL THERAPY | Facility: CLINIC | Age: 54
End: 2024-09-26
Payer: COMMERCIAL

## 2024-09-26 DIAGNOSIS — Z96.611 STATUS POST REVERSE TOTAL ARTHROPLASTY OF RIGHT SHOULDER: ICD-10-CM

## 2024-09-26 DIAGNOSIS — M19.011 OSTEOARTHRITIS OF RIGHT SHOULDER, UNSPECIFIED OSTEOARTHRITIS TYPE: Primary | ICD-10-CM

## 2024-09-26 PROCEDURE — 97140 MANUAL THERAPY 1/> REGIONS: CPT

## 2024-09-26 PROCEDURE — 97110 THERAPEUTIC EXERCISES: CPT

## 2024-09-26 NOTE — PROGRESS NOTES
Daily Note     Today's date: 2024  Patient name: Jean Elam  : 1970  MRN: [id number]  Referring provider: Thiago Bone MD  Dx:   Encounter Diagnosis     ICD-10-CM    1. Osteoarthritis of right shoulder, unspecified osteoarthritis type  M19.011       2. Status post reverse total arthroplasty of right shoulder  Z96.611           Start Time: 1111  Stop Time: 1201  Total time in clinic (min): 50 minutes    Subjective: Patient reports that he has almost no pain in the shoulder at this point but continues to have very limited motion.  New symptoms/problems include none.  Patient reports no pain/soreness after last session.  HEP/ Activity Recommendations:  no new updates  Progress Towards Goals: no new updates      Objective: See treatment diary below      Assessment: Patient demonstrates improving R shoulder PROM and AROM this session.  Patient completed today's session without increase in pain.  UBE performed in order to improve UE muscular endurance.  ROM does not allow patient to perform wall slides as patient is unable to raise above shoulder height.  Gave light orange TB to add front raises and lateral raises for HEP.  Future sessions should continue to progress AROM and strengthening exercises as able. Tolerated treatment fair. Patient would benefit from continued PT.      Plan: Continue per plan of care.      Precautions: depression, HTN, RA  POC expiration: 24      Foto             Manuals 9/26 8/28 8/30 9/3 9/5 9/10 9/13 9/16 9/20 9/23   R shoulder PROM GS  15 min GS  15 min GS  15 min GS  15 min GS  15 min GS  15 min GS  10 min GS  10 min GS  10 min GS  10 min   Cupping                                       Neuro Re-Ed             Scap retraction                                                                                           Ther Ex             Cane ER X20 X20 x15 x15 x20 x20 x20 x20 x20 x20   Cane flexion x20 x20 x15 x15 x20 x20 x20 x20 x20 x20   Elbow flexion X20  5#  supinated, hammer x20 x20 x20 x20 x20 X20  3# X20  3#  Supinated, hammer X20  5# supinated, hammer X20  5# supinated, hammer   pendulums x20 x15 x15 x15 x20 x20  x20 x20 x20   Pulleys x20  x20 2x10 2x10 2x10 2x10 2x10 x20 x20   Standing cane ABD      x20 x20 x20 x20    Wall slide unable      unable      Table slide- flex, ABD x20      x20 x20 x20 x20   TB row X20 btb      X20  otb X20  otb X20  gtb X20  btb   TB ext X20  btb      X20  otb X20  otb X20  gtb X20  btb   SL AROM             Standing AROM- flex, ABD X20 ea        X10 ea X20 ea   TB front, lateral raise NV            Pt edu             UBE 2'2'     2' forward 2'2' 2'2' 3'3' 2'2'   Ther Activity             Gripping                          Gait Training                                       Modalities

## 2024-09-30 ENCOUNTER — OFFICE VISIT (OUTPATIENT)
Dept: PHYSICAL THERAPY | Facility: CLINIC | Age: 54
End: 2024-09-30
Payer: COMMERCIAL

## 2024-09-30 DIAGNOSIS — Z96.611 STATUS POST REVERSE TOTAL ARTHROPLASTY OF RIGHT SHOULDER: ICD-10-CM

## 2024-09-30 DIAGNOSIS — M19.011 OSTEOARTHRITIS OF RIGHT SHOULDER, UNSPECIFIED OSTEOARTHRITIS TYPE: Primary | ICD-10-CM

## 2024-09-30 PROCEDURE — 97110 THERAPEUTIC EXERCISES: CPT

## 2024-09-30 PROCEDURE — 97140 MANUAL THERAPY 1/> REGIONS: CPT

## 2024-09-30 NOTE — PROGRESS NOTES
Daily Note     Today's date: 2024  Patient name: Jean Elam  : 1970  MRN: [id number]  Referring provider: Thiago Bone MD  Dx:   Encounter Diagnosis     ICD-10-CM    1. Osteoarthritis of right shoulder, unspecified osteoarthritis type  M19.011       2. Status post reverse total arthroplasty of right shoulder  Z96.611           Start Time: 1115  Stop Time: 1202  Total time in clinic (min): 47 minutes    Subjective: Patient reports that he was not able to perform shoulder flexion/ABD with resistance bands due to weakness.  New symptoms/problems include none.  Patient reports no pain/soreness after last session.  HEP/ Activity Recommendations:  continuing with mobility exercises  Progress Towards Goals: still only able to get arm to shoulder height actively      Objective: See treatment diary below      Assessment: Patient demonstrates consistent R shoulder active and passive ROM this session.  Patient completed today's session without increase in pain.  UBE performed in order to improve UE muscular endurance.  Encouraged patient that AROM should continue to improve as he gets stronger.  Future sessions should continue to progress strengthening exercises with AROM as able. Tolerated treatment well. Patient would benefit from continued PT.      Plan: Continue per plan of care.      Precautions: depression, HTN, RA  POC expiration: 24      Foto             Manuals  9/3 9/5 9/10 9/13 9/16 9/20 9/23   R shoulder PROM GS  15 min GS  10 min GS  15 min GS  15 min GS  15 min GS  15 min GS  10 min GS  10 min GS  10 min GS  10 min   Cupping                                       Neuro Re-Ed             Scap retraction                                                                                           Ther Ex             Cane ER X20 x20 x15 x15 x20 x20 x20 x20 x20 x20   Cane flexion x20 x20 x15 x15 x20 x20 x20 x20 x20 x20   Elbow flexion X20  5# supinated, hammer X20  7.5#  Supinated,  hammer x20 x20 x20 x20 X20  3# X20  3#  Supinated, hammer X20  5# supinated, hammer X20  5# supinated, hammer   pendulums x20 x20 x15 x15 x20 x20  x20 x20 x20   Pulleys x20 x20 x20 2x10 2x10 2x10 2x10 2x10 x20 x20   Standing cane ABD  x20    x20 x20 x20 x20    Wall slide unable      unable      Table slide- flex, ABD x20 x20     x20 x20 x20 x20   TB row X20 btb X20  btb     X20  otb X20  otb X20  gtb X20  btb   TB ext X20  btb X20  btb     X20  otb X20  otb X20  gtb X20  btb   SL AROM             Standing AROM- flex, ABD X20 ea X20 ea       X10 ea X20 ea   TB front, lateral raise             Pt edu             UBE 2'2' 2'2'    2' forward 2'2' 2'2' 3'3' 2'2'   Ther Activity             Gripping                          Gait Training                                       Modalities

## 2024-10-01 ENCOUNTER — LAB REQUISITION (OUTPATIENT)
Dept: LAB | Facility: HOSPITAL | Age: 54
End: 2024-10-01
Payer: COMMERCIAL

## 2024-10-01 DIAGNOSIS — Z96.611 PRESENCE OF RIGHT ARTIFICIAL SHOULDER JOINT: ICD-10-CM

## 2024-10-01 DIAGNOSIS — M19.011 PRIMARY OSTEOARTHRITIS, RIGHT SHOULDER: ICD-10-CM

## 2024-10-01 LAB
ALBUMIN SERPL BCG-MCNC: 4.4 G/DL (ref 3.5–5)
ALP SERPL-CCNC: 77 U/L (ref 34–104)
ALT SERPL W P-5'-P-CCNC: 28 U/L (ref 7–52)
AST SERPL W P-5'-P-CCNC: 23 U/L (ref 13–39)
BASOPHILS # BLD AUTO: 0.04 THOUSANDS/ÂΜL (ref 0–0.1)
BASOPHILS NFR BLD AUTO: 1 % (ref 0–1)
BILIRUB DIRECT SERPL-MCNC: 0.05 MG/DL (ref 0–0.2)
BILIRUB SERPL-MCNC: 0.4 MG/DL (ref 0.2–1)
CHOLEST SERPL-MCNC: 182 MG/DL
CREAT SERPL-MCNC: 0.95 MG/DL (ref 0.6–1.3)
CRP SERPL QL: <1 MG/L
EOSINOPHIL # BLD AUTO: 0.47 THOUSAND/ÂΜL (ref 0–0.61)
EOSINOPHIL NFR BLD AUTO: 10 % (ref 0–6)
ERYTHROCYTE [DISTWIDTH] IN BLOOD BY AUTOMATED COUNT: 12.8 % (ref 11.6–15.1)
ERYTHROCYTE [SEDIMENTATION RATE] IN BLOOD: 5 MM/HOUR (ref 0–19)
GFR SERPL CREATININE-BSD FRML MDRD: 91 ML/MIN/1.73SQ M
HCT VFR BLD AUTO: 37.5 % (ref 36.5–49.3)
HDLC SERPL-MCNC: 48 MG/DL
HGB BLD-MCNC: 12.7 G/DL (ref 12–17)
IMM GRANULOCYTES # BLD AUTO: 0.04 THOUSAND/UL (ref 0–0.2)
IMM GRANULOCYTES NFR BLD AUTO: 1 % (ref 0–2)
LDLC SERPL CALC-MCNC: 107 MG/DL (ref 0–100)
LYMPHOCYTES # BLD AUTO: 1.73 THOUSANDS/ÂΜL (ref 0.6–4.47)
LYMPHOCYTES NFR BLD AUTO: 35 % (ref 14–44)
MCH RBC QN AUTO: 28.3 PG (ref 26.8–34.3)
MCHC RBC AUTO-ENTMCNC: 33.9 G/DL (ref 31.4–37.4)
MCV RBC AUTO: 84 FL (ref 82–98)
MONOCYTES # BLD AUTO: 0.42 THOUSAND/ÂΜL (ref 0.17–1.22)
MONOCYTES NFR BLD AUTO: 9 % (ref 4–12)
NEUTROPHILS # BLD AUTO: 2.23 THOUSANDS/ÂΜL (ref 1.85–7.62)
NEUTS SEG NFR BLD AUTO: 44 % (ref 43–75)
NONHDLC SERPL-MCNC: 134 MG/DL
NRBC BLD AUTO-RTO: 0 /100 WBCS
PLATELET # BLD AUTO: 195 THOUSANDS/UL (ref 149–390)
PMV BLD AUTO: 10.8 FL (ref 8.9–12.7)
PROT SERPL-MCNC: 6.9 G/DL (ref 6.4–8.4)
RBC # BLD AUTO: 4.49 MILLION/UL (ref 3.88–5.62)
TRIGL SERPL-MCNC: 136 MG/DL
WBC # BLD AUTO: 4.93 THOUSAND/UL (ref 4.31–10.16)

## 2024-10-01 PROCEDURE — 86140 C-REACTIVE PROTEIN: CPT | Performed by: NURSE PRACTITIONER

## 2024-10-01 PROCEDURE — 85025 COMPLETE CBC W/AUTO DIFF WBC: CPT | Performed by: NURSE PRACTITIONER

## 2024-10-01 PROCEDURE — 80076 HEPATIC FUNCTION PANEL: CPT | Performed by: NURSE PRACTITIONER

## 2024-10-01 PROCEDURE — 82565 ASSAY OF CREATININE: CPT | Performed by: NURSE PRACTITIONER

## 2024-10-01 PROCEDURE — 80061 LIPID PANEL: CPT | Performed by: NURSE PRACTITIONER

## 2024-10-01 PROCEDURE — 85652 RBC SED RATE AUTOMATED: CPT | Performed by: NURSE PRACTITIONER

## 2024-10-04 ENCOUNTER — APPOINTMENT (OUTPATIENT)
Dept: RADIOLOGY | Facility: CLINIC | Age: 54
End: 2024-10-04
Payer: COMMERCIAL

## 2024-10-04 ENCOUNTER — EVALUATION (OUTPATIENT)
Dept: PHYSICAL THERAPY | Facility: CLINIC | Age: 54
End: 2024-10-04
Payer: COMMERCIAL

## 2024-10-04 DIAGNOSIS — M79.674 PAIN OF RIGHT GREAT TOE: ICD-10-CM

## 2024-10-04 DIAGNOSIS — M19.011 OSTEOARTHRITIS OF RIGHT SHOULDER, UNSPECIFIED OSTEOARTHRITIS TYPE: Primary | ICD-10-CM

## 2024-10-04 DIAGNOSIS — Z96.611 STATUS POST REVERSE TOTAL ARTHROPLASTY OF RIGHT SHOULDER: ICD-10-CM

## 2024-10-04 PROCEDURE — 73660 X-RAY EXAM OF TOE(S): CPT

## 2024-10-04 PROCEDURE — 97140 MANUAL THERAPY 1/> REGIONS: CPT

## 2024-10-04 PROCEDURE — 97110 THERAPEUTIC EXERCISES: CPT

## 2024-10-04 NOTE — PROGRESS NOTES
PT Re-Evaluation     Today's date: 10/4/2024  Patient name: Jean Elam  : 1970  MRN: [id number]  Referring provider: Thiaog Bone MD  Dx:   Encounter Diagnosis     ICD-10-CM    1. Osteoarthritis of right shoulder, unspecified osteoarthritis type  M19.011       2. Status post reverse total arthroplasty of right shoulder  Z96.611           Start Time: 0900  Stop Time: 0948  Total time in clinic (min): 48 minutes    Assessment  Impairments: abnormal coordination, abnormal muscle firing, abnormal or restricted ROM, activity intolerance, impaired physical strength, lacks appropriate home exercise program, pain with function, weight-bearing intolerance and poor posture   Symptom irritability: low    Assessment details: Jean Elam is a pleasant 54 y.o. male who presents s/p R rTSA.  Movement diagnosis of R shoulder hypomobility with primary nociceptive pain.  With PT, he has made significant improvements in R shoulder PROM.  Remaining deficits include significant limitations in R shoulder AROM, R shoulder strength and activity tolerance.  Functionally, he is still unable to reach shelves at shoulder height or lift any weighted objects.  Gave patient cane flexion, cane ER, pendulums, and elbow flexion for HEP.  Patient would continue to benefit from skilled PT services to address these deficits and return to PLOF.    Comparable signs:  1) R shoulder flexion ROM  2) R shoulder ABD ROM  Understanding of Dx/Px/POC: fair     Prognosis: fair    Goals  STGs  Patient will be independent with HEP in 4 weeks -met  Patient will decrease pain by 2 points in 4 weeks -met  LTGs  Patient will achieve 120* R shoulder flexion AROM in 12 weeks  Patient will achieve 110* R shoulder ABD AROM in 12 weeks  Patient will be able to sleep through the night without limitation in 12 weeks    Plan  Patient would benefit from: skilled physical therapy    Planned therapy interventions: home exercise program, therapeutic exercise,  "therapeutic activities, manual therapy, motor coordination training, neuromuscular re-education, patient education, strengthening, activity modification, body mechanics training, flexibility, functional ROM exercises, stretching and postural training    Frequency: 2x week  Duration in weeks: 8  Plan of Care beginning date: 10/4/2024  Plan of Care expiration date: 2024  Treatment plan discussed with: patient        Subjective Evaluation    History of Present Illness  Date of surgery: 2024  Mechanism of injury: Pain location: R shoulder-anterior and posterior aspects  Pain severity: 1-0-6  Aggs: sleeping, movement  Eases: ice, rest  Irritability: high  REGINALD/timeline: patient has complex history of R shoulder pain and limited mobility, about 7 total surgeries on it, developed RA 20 years old which exacerbated  symptoms, shoulder \"froze\" after 3rd surgery resulting in limit range for many years, 17 years ago patient had R TSA which was good for about 15 years, a year ago he started waking up with discomfort and symptoms worsened, rTSA performed on 26  Red flags: tingling in 4th and 5th digits  PMH/PSH: see above for complex surgical history  L Hand Dominant    10/4- patient states that he feels 150% better in terms of pain since surgery, but not even 50% of his function has returned  Patient Goals  Patient goals for therapy: decreased pain  Patient goal: good night sleep  Pain  Current pain ratin  At best pain ratin  At worst pain ratin          Objective     Postural Observations  Seated posture: fair    Additional Postural Observation Details  Forward head, rounded shoulders    Cervical/Thoracic Screen   Cervical range of motion within normal limits with the following exceptions: Moderate limitation into cervical extension, all other motions WNL    Neurological Testing     Sensation     Shoulder   Left Shoulder   Intact: light touch    Right Shoulder   Intact: Light touch    Reflexes   Left "   Biceps (C5/C6): normal (2+)  Brachioradialis (C6): normal (2+)  Triceps (C7): normal (2+)    Right   Biceps (C5/C6): normal (2+)  Brachioradialis (C6): normal (2+)    Active Range of Motion   Left Shoulder   Normal active range of motion    Right Shoulder   Flexion: 79 degrees   Abduction: 75 degrees     Additional Active Range of Motion Details  R elbow flex/ext WNL  R pronation/supination WNL    Passive Range of Motion     Right Shoulder   Flexion: 150 degrees   Abduction: 115 degrees   External rotation 0°: 55 degrees     Strength/Myotome Testing     Additional Strength Details  Deferred at this time             Precautions: depression, HTN, RA  POC expiration: 11/29/24      Foto   10/4          Manuals 9/26 9/30 10/4 9/3 9/5 9/10 9/13 9/16 9/20 9/23   R shoulder PROM GS  15 min GS  10 min GS  10 min GS  15 min GS  15 min GS  15 min GS  10 min GS  10 min GS  10 min GS  10 min   Cupping                                       Neuro Re-Ed             Scap retraction                                                                                           Ther Ex             Cane ER X20 x20 x20 x15 x20 x20 x20 x20 x20 x20   Cane flexion x20 x20 x20 x15 x20 x20 x20 x20 x20 x20   Elbow flexion X20  5# supinated, hammer X20  7.5#  Supinated, hammer  x20 x20 x20 X20  3# X20  3#  Supinated, hammer X20  5# supinated, hammer X20  5# supinated, hammer   pendulums x20 x20 x20 x15 x20 x20  x20 x20 x20   Pulleys x20 x20 x20 2x10 2x10 2x10 2x10 2x10 x20 x20   Standing cane ABD  x20    x20 x20 x20 x20    Wall slide unable      unable      Table slide- flex, ABD x20 x20 x20    x20 x20 x20 x20   TB row X20 btb X20  btb X20  plum    X20  otb X20  otb X20  gtb X20  btb   TB ext X20  btb X20  btb X20  plum    X20  otb X20  otb X20  gtb X20  btb   SL AROM             Standing AROM- flex, ABD X20 ea X20 ea 2x10  1#      X10 ea X20 ea   TB front, lateral raise             PT re eval   GS          Pt edu             UBE 2'2' 2'2' 2'2'    2' forward 2'2' 2'2' 3'3' 2'2'   Ther Activity             Gripping                          Gait Training                                       Modalities

## 2024-10-04 NOTE — LETTER
2024    Thiago Bone MD  [address] [place] [id number]    Patient: Jean Elam   YOB: 1970   Date of Visit: 10/4/2024     Encounter Diagnosis     ICD-10-CM    1. Osteoarthritis of right shoulder, unspecified osteoarthritis type  M19.011       2. Status post reverse total arthroplasty of right shoulder  Z96.611           Dear Dr. Bone:    Thank you for your recent referral of Jean Elam. Please review the attached evaluation summary from Jean's recent visit.     Please verify that you agree with the plan of care by signing the attached order.     If you have any questions or concerns, please do not hesitate to call.     I sincerely appreciate the opportunity to share in the care of one of your patients and hope to have another opportunity to work with you in the near future.       Sincerely,    Miguel Luke, PT      Referring Provider:      I certify that I have read the below Plan of Care and certify the need for these services furnished under this plan of treatment while under my care.                    Thiago Bone MD  [address] [place] [id number]  Via Fax: [phone]          PT Re-Evaluation     Today's date: 10/4/2024  Patient name: Jean Elam  : 1970  MRN: [id number]  Referring provider: Thiago Bone MD  Dx:   Encounter Diagnosis     ICD-10-CM    1. Osteoarthritis of right shoulder, unspecified osteoarthritis type  M19.011       2. Status post reverse total arthroplasty of right shoulder  Z96.611           Start Time: 0900  Stop Time: 0948  Total time in clinic (min): 48 minutes    Assessment  Impairments: abnormal coordination, abnormal muscle firing, abnormal or restricted ROM, activity intolerance, impaired physical strength, lacks appropriate home exercise program, pain with function, weight-bearing intolerance and poor posture   Symptom irritability: low    Assessment details: Jean Elam is a pleasant 54  y.o. male who presents s/p R rTSA.  Movement diagnosis of R shoulder hypomobility with primary nociceptive pain.  With PT, he has made significant improvements in R shoulder PROM.  Remaining deficits include significant limitations in R shoulder AROM, R shoulder strength and activity tolerance.  Functionally, he is still unable to reach shelves at shoulder height or lift any weighted objects.  Gave patient cane flexion, cane ER, pendulums, and elbow flexion for HEP.  Patient would continue to benefit from skilled PT services to address these deficits and return to PLOF.    Comparable signs:  1) R shoulder flexion ROM  2) R shoulder ABD ROM  Understanding of Dx/Px/POC: fair     Prognosis: fair    Goals  STGs  Patient will be independent with HEP in 4 weeks -met  Patient will decrease pain by 2 points in 4 weeks -met  LTGs  Patient will achieve 120* R shoulder flexion AROM in 12 weeks  Patient will achieve 110* R shoulder ABD AROM in 12 weeks  Patient will be able to sleep through the night without limitation in 12 weeks    Plan  Patient would benefit from: skilled physical therapy    Planned therapy interventions: home exercise program, therapeutic exercise, therapeutic activities, manual therapy, motor coordination training, neuromuscular re-education, patient education, strengthening, activity modification, body mechanics training, flexibility, functional ROM exercises, stretching and postural training    Frequency: 2x week  Duration in weeks: 8  Plan of Care beginning date: 10/4/2024  Plan of Care expiration date: 11/29/2024  Treatment plan discussed with: patient        Subjective Evaluation    History of Present Illness  Date of surgery: 7/31/2024  Mechanism of injury: Pain location: R shoulder-anterior and posterior aspects  Pain severity: 1-0-6  Aggs: sleeping, movement  Eases: ice, rest  Irritability: high  REGINALD/timeline: patient has complex history of R shoulder pain and limited mobility, about 7 total  "surgeries on it, developed RA 20 years old which exacerbated  symptoms, shoulder \"froze\" after 3rd surgery resulting in limit range for many years, 17 years ago patient had R TSA which was good for about 15 years, a year ago he started waking up with discomfort and symptoms worsened, rTSA performed on 26  Red flags: tingling in 4th and 5th digits  PMH/PSH: see above for complex surgical history  L Hand Dominant    10/4- patient states that he feels 150% better in terms of pain since surgery, but not even 50% of his function has returned  Patient Goals  Patient goals for therapy: decreased pain  Patient goal: good night sleep  Pain  Current pain ratin  At best pain ratin  At worst pain ratin          Objective     Postural Observations  Seated posture: fair    Additional Postural Observation Details  Forward head, rounded shoulders    Cervical/Thoracic Screen   Cervical range of motion within normal limits with the following exceptions: Moderate limitation into cervical extension, all other motions WNL    Neurological Testing     Sensation     Shoulder   Left Shoulder   Intact: light touch    Right Shoulder   Intact: Light touch    Reflexes   Left   Biceps (C5/C6): normal (2+)  Brachioradialis (C6): normal (2+)  Triceps (C7): normal (2+)    Right   Biceps (C5/C6): normal (2+)  Brachioradialis (C6): normal (2+)    Active Range of Motion   Left Shoulder   Normal active range of motion    Right Shoulder   Flexion: 79 degrees   Abduction: 75 degrees     Additional Active Range of Motion Details  R elbow flex/ext WNL  R pronation/supination WNL    Passive Range of Motion     Right Shoulder   Flexion: 150 degrees   Abduction: 115 degrees   External rotation 0°: 55 degrees     Strength/Myotome Testing     Additional Strength Details  Deferred at this time             Precautions: depression, HTN, RA  POC expiration: 24      Foto   10/4          Manuals 9/26 9/30 10/4 9/3 9/5 9/10 9/13 9/16 9/20 9/23 "   R shoulder PROM GS  15 min GS  10 min GS  10 min GS  15 min GS  15 min GS  15 min GS  10 min GS  10 min GS  10 min GS  10 min   Cupping                                       Neuro Re-Ed             Scap retraction                                                                                           Ther Ex             Cane ER X20 x20 x20 x15 x20 x20 x20 x20 x20 x20   Cane flexion x20 x20 x20 x15 x20 x20 x20 x20 x20 x20   Elbow flexion X20  5# supinated, hammer X20  7.5#  Supinated, hammer  x20 x20 x20 X20  3# X20  3#  Supinated, hammer X20  5# supinated, hammer X20  5# supinated, hammer   pendulums x20 x20 x20 x15 x20 x20  x20 x20 x20   Pulleys x20 x20 x20 2x10 2x10 2x10 2x10 2x10 x20 x20   Standing cane ABD  x20    x20 x20 x20 x20    Wall slide unable      unable      Table slide- flex, ABD x20 x20 x20    x20 x20 x20 x20   TB row X20 btb X20  btb X20  plum    X20  otb X20  otb X20  gtb X20  btb   TB ext X20  btb X20  btb X20  plum    X20  otb X20  otb X20  gtb X20  btb   SL AROM             Standing AROM- flex, ABD X20 ea X20 ea 2x10  1#      X10 ea X20 ea   TB front, lateral raise             PT re eval   GS          Pt edu             UBE 2'2' 2'2' 2'2'   2' forward 2'2' 2'2' 3'3' 2'2'   Ther Activity             Gripping                          Gait Training                                       Modalities

## 2024-10-07 ENCOUNTER — OFFICE VISIT (OUTPATIENT)
Dept: PHYSICAL THERAPY | Facility: CLINIC | Age: 54
End: 2024-10-07
Payer: COMMERCIAL

## 2024-10-07 DIAGNOSIS — Z96.611 STATUS POST REVERSE TOTAL ARTHROPLASTY OF RIGHT SHOULDER: ICD-10-CM

## 2024-10-07 DIAGNOSIS — M19.011 OSTEOARTHRITIS OF RIGHT SHOULDER, UNSPECIFIED OSTEOARTHRITIS TYPE: Primary | ICD-10-CM

## 2024-10-07 PROCEDURE — 97110 THERAPEUTIC EXERCISES: CPT

## 2024-10-07 PROCEDURE — 97140 MANUAL THERAPY 1/> REGIONS: CPT

## 2024-10-07 NOTE — PROGRESS NOTES
Daily Note     Today's date: 10/7/2024  Patient name: Jean Elam  : 1970  MRN: [id number]  Referring provider: Thiago Bone MD  Dx:   Encounter Diagnosis     ICD-10-CM    1. Osteoarthritis of right shoulder, unspecified osteoarthritis type  M19.011       2. Status post reverse total arthroplasty of right shoulder  Z96.611           Start Time: 1116  Stop Time: 1202  Total time in clinic (min): 46 minutes    Subjective: Patient reports that the posterior aspect of his shoulder was very tight yesterday and felt a lot of pulling with his stretching exercises.      Objective: See treatment diary below      Assessment: Patient demonstrates slowly improving L shoulder AROM this session.  Patient completed today's session without increase in pain.  UBE performed in order to improve UE muscular endurance.  Forward flexion continues to be more limited than ABD AROM, added standing cane flexion in attempt to address this deficit.  Future sessions should continue to progress strengthening exercises to increase AROM as able. Tolerated treatment fair. Patient would benefit from continued PT.      Plan: Continue per plan of care.      Precautions: depression, HTN, RA  POC expiration: 24    POC Expires Reeval for Medicare to be completed Unit LImit Auth Expiration Date PT/OT/STVisit Limit   24 By visit  n/a BOMN 24 60 visits                     TREATMENT DIARY  Auth Status DATE 10/7        60 visits Used 14         Remaining 46            Foto   10/4          Manuals 9/26 9/30 10/4 10/7 9/5 9/10 9/13 9/16 9/20 9/23   R shoulder PROM GS  15 min GS  10 min GS  10 min GS  10 min GS  15 min GS  15 min GS  10 min GS  10 min GS  10 min GS  10 min   Cupping                                       Neuro Re-Ed             Scap retraction                                                                                           Ther Ex             Cane ER X20 x20 x20 x20 x20 x20 x20 x20 x20 x20   Cane flexion  x20 x20 x20 x20 x20 x20 x20 x20 x20 x20   Elbow flexion X20  5# supinated, hammer X20  7.5#  Supinated, hammer  X20  10# supinated, hammer x20 x20 X20  3# X20  3#  Supinated, hammer X20  5# supinated, hammer X20  5# supinated, hammer   pendulums x20 x20 x20 x20 x20 x20  x20 x20 x20   Pulleys x20 x20 x20  2x10 2x10 2x10 2x10 x20 x20   Standing cane ABD  x20    x20 x20 x20 x20    Wall slide unable      unable      Table slide- flex, ABD x20 x20 x20 x20   x20 x20 x20 x20   TB row X20 btb X20  btb X20  plum X20 plum   X20  otb X20  otb X20  gtb X20  btb   TB ext X20  btb X20  btb X20  plum X20  plum   X20  otb X20  otb X20  gtb X20  btb   Standing cane flexion    x20         SL AROM             Standing AROM- flex, ABD X20 ea X20 ea 2x10  1# 2x10  1#  2x15  1#     X10 ea X20 ea   TB front, lateral raise             PT re eval   GS          Pt edu             UBE 2'2' 2'2' 2'2' 2'2'  2' forward 2'2' 2'2' 3'3' 2'2'   Ther Activity             Gripping                          Gait Training                                       Modalities

## 2024-10-11 ENCOUNTER — OFFICE VISIT (OUTPATIENT)
Dept: PHYSICAL THERAPY | Facility: CLINIC | Age: 54
End: 2024-10-11
Payer: COMMERCIAL

## 2024-10-11 DIAGNOSIS — Z96.611 STATUS POST REVERSE TOTAL ARTHROPLASTY OF RIGHT SHOULDER: ICD-10-CM

## 2024-10-11 DIAGNOSIS — M19.011 OSTEOARTHRITIS OF RIGHT SHOULDER, UNSPECIFIED OSTEOARTHRITIS TYPE: Primary | ICD-10-CM

## 2024-10-11 PROCEDURE — 97140 MANUAL THERAPY 1/> REGIONS: CPT

## 2024-10-11 PROCEDURE — 97110 THERAPEUTIC EXERCISES: CPT

## 2024-10-11 NOTE — PROGRESS NOTES
Daily Note     Today's date: 10/11/2024  Patient name: Jean Elam  : 1970  MRN: [id number]  Referring provider: Thiago Bone MD  Dx:   Encounter Diagnosis     ICD-10-CM    1. Osteoarthritis of right shoulder, unspecified osteoarthritis type  M19.011       2. Status post reverse total arthroplasty of right shoulder  Z96.611           Start Time: 858  Stop Time: 945  Total time in clinic (min): 47 minutes    Subjective: Patient reports that his shoulder has been feeling fine overall in regards to pain.  He states that it is very stiff today.      Objective: See treatment diary below      Assessment: Patient demonstrates improving L shoulder strength this session.  Patient completed today's session without increase in pain.  UBE performed in order to improve UE muscular endurance.  Pec weakness limits patient's ability to complete chest press.  Increasing pec strength may help to improve shoulder flexion AROM.  Future sessions should continue to progress strengthening exercises as able. Tolerated treatment well. Patient would benefit from continued PT.      Plan: Continue per plan of care.      Precautions: depression, HTN, RA  POC expiration: 24    POC Expires Reeval for Medicare to be completed Unit LImit Auth Expiration Date PT/OT/STVisit Limit   24 By visit  n/a BOMN 24 60 visits                     TREATMENT DIARY  Auth Status DATE 10/7 10/11       60 visits Used 14 15        Remaining 46 45           Foto   10/4          Manuals 9/26 9/30 10/4 10/7 10/11 9/10 9/13 9/16 9/20 9/23   R shoulder PROM GS  15 min GS  10 min GS  10 min GS  10 min GS  10 min GS  15 min GS  10 min GS  10 min GS  10 min GS  10 min   Cupping                                       Neuro Re-Ed             Scap retraction                                                                                           Ther Ex             Cane ER X20 x20 x20 x20  x20 x20 x20 x20 x20   Cane flexion x20 x20 x20 x20  x20  x20 x20 x20 x20   Elbow flexion X20  5# supinated, hammer X20  7.5#  Supinated, hammer  X20  10# supinated, hammer  x20 X20  3# X20  3#  Supinated, hammer X20  5# supinated, hammer X20  5# supinated, hammer   pendulums x20 x20 x20 x20 x20 x20  x20 x20 x20   Pulleys x20 x20 x20   2x10 2x10 2x10 x20 x20   Standing cane ABD  x20    x20 x20 x20 x20    Wall slide unable      unable      Table slide- flex, ABD x20 x20 x20 x20 x20  x20 x20 x20 x20   TB row X20 btb X20  btb X20  plum X20 plum X20 plum  X20  otb X20  otb X20  gtb X20  btb   TB ext X20  btb X20  btb X20  plum X20  plum X20 plum  X20  otb X20  otb X20  gtb X20  btb   Standing cane flexion    x20         LPD     X10  22#  X10  44#  SA  44#  x10        SL AROM             Standing AROM- flex, ABD X20 ea X20 ea 2x10  1# 2x10  1#  2x15  1# 2x10  1#  2x10  1#    X10 ea X20 ea   Chest press     2x10  3#        TB front, lateral raise             PT re eval   GS          Pt edu             UBE 2'2' 2'2' 2'2' 2'2' 2'2' 2' forward 2'2' 2'2' 3'3' 2'2'   Ther Activity             Gripping                          Gait Training                                       Modalities

## 2024-10-14 ENCOUNTER — OFFICE VISIT (OUTPATIENT)
Dept: PHYSICAL THERAPY | Facility: CLINIC | Age: 54
End: 2024-10-14
Payer: COMMERCIAL

## 2024-10-14 DIAGNOSIS — M19.011 OSTEOARTHRITIS OF RIGHT SHOULDER, UNSPECIFIED OSTEOARTHRITIS TYPE: Primary | ICD-10-CM

## 2024-10-14 DIAGNOSIS — Z96.611 STATUS POST REVERSE TOTAL ARTHROPLASTY OF RIGHT SHOULDER: ICD-10-CM

## 2024-10-14 PROCEDURE — 97140 MANUAL THERAPY 1/> REGIONS: CPT

## 2024-10-14 PROCEDURE — 97110 THERAPEUTIC EXERCISES: CPT

## 2024-10-14 NOTE — PROGRESS NOTES
Daily Note     Today's date: 10/14/2024  Patient name: Jean Elam  : 1970  MRN: [id number]  Referring provider: Thiago Bone MD  Dx:   Encounter Diagnosis     ICD-10-CM    1. Osteoarthritis of right shoulder, unspecified osteoarthritis type  M19.011       2. Status post reverse total arthroplasty of right shoulder  Z96.611           Start Time: 1118  Stop Time: 1201  Total time in clinic (min): 43 minutes    Subjective: Patient reports that his shoulder weakness remains but overall pain is much better than before surgery.      Objective: See treatment diary below      Assessment: Patient demonstrates improving R shoulder strength this session.  Patient completed today's session without increase in pain.  UBE performed in order to improve UE muscular endurance.  Introduced SL exercises and progressed TB to CC in order to continue challenging deltoid and scapular muscle strength.  Future sessions should continue to progress strengthening exercises as able. Tolerated treatment fair. Patient demonstrated fatigue post treatment and would benefit from continued PT.      Plan: Continue per plan of care.      Precautions: depression, HTN, RA  POC expiration: 24    POC Expires Reeval for Medicare to be completed Unit LImit Auth Expiration Date PT/OT/STVisit Limit   24 By visit  n/a BOMN 24 60 visits                     TREATMENT DIARY  Auth Status DATE 10/7 10/11 10/14      60 visits Used 14 15 16       Remaining 46 45 44          Foto   10/4          Manuals 9/26 9/30 10/4 10/7 10/11 10/14 9/13 9/16 9/20 9/23   R shoulder PROM GS  15 min GS  10 min GS  10 min GS  10 min GS  10 min GS  10 min GS  10 min GS  10 min GS  10 min GS  10 min   Cupping                                       Neuro Re-Ed             Scap retraction                                                                                           Ther Ex             Cane ER X20 x20 x20 x20   x20 x20 x20 x20   Cane flexion x20  x20 x20 x20   x20 x20 x20 x20   Elbow flexion X20  5# supinated, hammer X20  7.5#  Supinated, hammer  X20  10# supinated, hammer   X20  3# X20  3#  Supinated, hammer X20  5# supinated, hammer X20  5# supinated, hammer   pendulums x20 x20 x20 x20 x20 x20  x20 x20 x20   Pulleys x20 x20 x20    2x10 2x10 x20 x20   Standing cane ABD  x20     x20 x20 x20    Wall slide unable      unable      Table slide- flex, ABD x20 x20 x20 x20 x20  x20 x20 x20 x20   TB row X20 btb X20  btb X20  plum X20 plum X20 plum X20  SA  CC  22# X20  otb X20  otb X20  gtb X20  btb   TB ext X20  btb X20  btb X20  plum X20  plum X20 plum X20  SA  CC  22# X20  otb X20  otb X20  gtb X20  btb   TB D2      2x10  Light otb       SL ABD      X20  2#       SL horizontal ABD      2x10  1#       Standing cane flexion    x20         LPD     X10  22#  X10  44#  SA  44#  x10 SA  44#  2x10       SL AROM             Standing AROM- flex, ABD X20 ea X20 ea 2x10  1# 2x10  1#  2x15  1# 2x10  1#  2x10  1# 2x10 ea   X10 ea X20 ea   Chest press     2x10  3# 2x10  3#       TB front, lateral raise             PT re eval   GS          Pt edu             UBE 2'2' 2'2' 2'2' 2'2' 2'2' 2'2' 2'2' 2'2' 3'3' 2'2'   Ther Activity             Gripping                          Gait Training                                       Modalities

## 2024-10-18 ENCOUNTER — OFFICE VISIT (OUTPATIENT)
Dept: PHYSICAL THERAPY | Facility: CLINIC | Age: 54
End: 2024-10-18
Payer: COMMERCIAL

## 2024-10-18 DIAGNOSIS — Z96.611 STATUS POST REVERSE TOTAL ARTHROPLASTY OF RIGHT SHOULDER: ICD-10-CM

## 2024-10-18 DIAGNOSIS — M19.011 OSTEOARTHRITIS OF RIGHT SHOULDER, UNSPECIFIED OSTEOARTHRITIS TYPE: Primary | ICD-10-CM

## 2024-10-18 PROCEDURE — 97110 THERAPEUTIC EXERCISES: CPT

## 2024-10-18 NOTE — PROGRESS NOTES
Daily Note     Today's date: 10/18/2024  Patient name: Jean Elam  : 1970  MRN: [id number]  Referring provider: Thiago Bone MD  Dx:   Encounter Diagnosis     ICD-10-CM    1. Osteoarthritis of right shoulder, unspecified osteoarthritis type  M19.011       2. Status post reverse total arthroplasty of right shoulder  Z96.611           Start Time: 905  Stop Time: 943  Total time in clinic (min): 38 minutes    Subjective: Patient reports that his shoulder was extremely fatigued yesterday with the second set of strengthening exercises.  The fatigue was so severe that he wasn't able to get to shoulder height.  He also states that he was having pain radiating down posterior aspect of RUE.      Objective: See treatment diary below      Assessment: Patient demonstrates improving R shoulder strength this session.  Patient completed today's session without increase in pain.  UBE performed in order to improve UE muscular endurance.  Care now transitioning mostly to strengthening exercises with a few stretching exercises to loosen up shoulder.  Reviewed updated HEP with patient.  Future sessions should continue to progress strengthening exercises as able. Tolerated treatment well. Patient would benefit from continued PT.      Plan: Continue per plan of care.      Precautions: depression, HTN, RA  POC expiration: 24    POC Expires Reeval for Medicare to be completed Unit LImit Auth Expiration Date PT/OT/STVisit Limit   24 By visit  n/a BOMN 24 60 visits                     TREATMENT DIARY  Auth Status DATE 10/7 10/11 10/14 10/18     60 visits Used 14 15 16 17      Remaining 46 45 44 43         Foto   10/4          Manuals 9/26 9/30 10/4 10/7 10/11 10/14 10/18 9/16 9/20 9/23   R shoulder PROM GS  15 min GS  10 min GS  10 min GS  10 min GS  10 min GS  10 min  GS  10 min GS  10 min GS  10 min   Cupping                                       Neuro Re-Ed             Scap retraction                                                                                            Ther Ex             Cane ER X20 x20 x20 x20    x20 x20 x20   Cane flexion x20 x20 x20 x20    x20 x20 x20   Elbow flexion X20  5# supinated, hammer X20  7.5#  Supinated, hammer  X20  10# supinated, hammer    X20  3#  Supinated, hammer X20  5# supinated, hammer X20  5# supinated, hammer   pendulums x20 x20 x20 x20 x20 x20 x20 x20 x20 x20   Pulleys x20 x20 x20     2x10 x20 x20   Standing cane ABD  x20      x20 x20    Wall slide unable            Table slide- flex, ABD x20 x20 x20 x20 x20  x20 x20 x20 x20   TB row X20 btb X20  btb X20  plum X20 plum X20 plum X20  SA  CC  22# X20  SA  CC  22# X20  otb X20  gtb X20  btb   TB ext X20  btb X20  btb X20  plum X20  plum X20 plum X20  SA  CC  22# X20  SA  CC  22# X20  otb X20  gtb X20  btb   TB D2      2x10  Light otb 2x10  Light otb      SL ABD      X20  2# X20  2#      SL horizontal ABD      2x10  1# 2x10  2#      Standing cane flexion    x20         LPD     X10  22#  X10  44#  SA  44#  x10 SA  44#  2x10 SA  44#  2x10      SL AROM             Standing AROM- flex, ABD X20 ea X20 ea 2x10  1# 2x10  1#  2x15  1# 2x10  1#  2x10  1# 2x10 ea 2x10  1#  X10 ea X20 ea   Chest press     2x10  3# 2x10  3# 2x10  3#      CC ADD       2x10  22#      TB front, lateral raise             PT re eval   GS          Pt edu             UBE 2'2' 2'2' 2'2' 2'2' 2'2' 2'2' 2'2' 2'2' 3'3' 2'2'   Ther Activity             Gripping                          Gait Training                                       Modalities

## 2024-10-23 ENCOUNTER — OFFICE VISIT (OUTPATIENT)
Dept: PHYSICAL THERAPY | Facility: CLINIC | Age: 54
End: 2024-10-23
Payer: COMMERCIAL

## 2024-10-23 DIAGNOSIS — Z96.611 STATUS POST REVERSE TOTAL ARTHROPLASTY OF RIGHT SHOULDER: ICD-10-CM

## 2024-10-23 DIAGNOSIS — M19.011 OSTEOARTHRITIS OF RIGHT SHOULDER, UNSPECIFIED OSTEOARTHRITIS TYPE: Primary | ICD-10-CM

## 2024-10-23 PROCEDURE — 97110 THERAPEUTIC EXERCISES: CPT

## 2024-10-23 NOTE — PROGRESS NOTES
Daily Note     Today's date: 10/23/2024  Patient name: Jean Elam  : 1970  MRN: [id number]  Referring provider: Thiago Bone MD  Dx:   Encounter Diagnosis     ICD-10-CM    1. Osteoarthritis of right shoulder, unspecified osteoarthritis type  M19.011       2. Status post reverse total arthroplasty of right shoulder  Z96.611           Start Time: 1111  Stop Time: 1155  Total time in clinic (min): 44 minutes    Subjective: Patient reports that he took a full day off from exercises on  and the shoulder has felt better this week.  He states that he was able to initiate a few wall slides yesterday.  No soreness after last session.      Objective: See treatment diary below      Assessment: Patient demonstrates improving R shoulder strength this session.  Patient completed today's session without increase in pain.  UBE performed in order to improve UE muscular endurance.  Able to initiate wall slides today- patient completes with significant fatigue around 3 repetitions but no pain.  AROM continues to be limited as patient is unable to raise arm actively to 90*.  Future sessions should continue to progress strengthening exercises as able. Tolerated treatment well. Patient would benefit from continued PT.      Plan: Continue per plan of care.      Precautions: depression, HTN, RA  POC expiration: 24    POC Expires Reeval for Medicare to be completed Unit LImit Auth Expiration Date PT/OT/STVisit Limit   24 By visit  n/a BOMN 24 60 visits                     TREATMENT DIARY  Auth Status DATE 10/7 10/11 10/14 10/18 10/23    60 visits Used 14 15 16 17 18     Remaining 46 45 44 43 42        Foto   10/4          Manuals 9/26 9/30 10/4 10/7 10/11 10/14 10/18 10/23 9/20 9/23   R shoulder PROM GS  15 min GS  10 min GS  10 min GS  10 min GS  10 min GS  10 min   GS  10 min GS  10 min   Cupping                                       Neuro Re-Ed             Scap retraction                                                                                            Ther Ex             Cane ER X20 x20 x20 x20     x20 x20   Cane flexion x20 x20 x20 x20     x20 x20   Elbow flexion X20  5# supinated, hammer X20  7.5#  Supinated, hammer  X20  10# supinated, hammer     X20  5# supinated, hammer X20  5# supinated, hammer   pendulums x20 x20 x20 x20 x20 x20 x20 x20 x20 x20   Pulleys x20 x20 x20      x20 x20   Standing cane ABD  x20       x20    Wall slide unable            Table slide- flex, ABD x20 x20 x20 x20 x20  x20 X20 ea x20 x20   TB row X20 btb X20  btb X20  plum X20 plum X20 plum X20  SA  CC  22# X20  SA  CC  22# X20  SA  CC  44# X20  gtb X20  btb   TB ext X20  btb X20  btb X20  plum X20  plum X20 plum X20  SA  CC  22# X20  SA  CC  22# X20  SA  CC  22# X20  gtb X20  btb   TB D2      2x10  Light otb 2x10  Light otb 2x10  Light  otb     SL ABD      X20  2# X20  2# X20  2#     SL horizontal ABD      2x10  1# 2x10  2# 2x10  2#     Wall slide        x10     LPD     X10  22#  X10  44#  SA  44#  x10 SA  44#  2x10 SA  44#  2x10 SA  44#  2x10     SL AROM             Standing AROM- flex, ABD X20 ea X20 ea 2x10  1# 2x10  1#  2x15  1# 2x10  1#  2x10  1# 2x10 ea 2x10  1# 2x10  1# X10 ea X20 ea   Chest press     2x10  3# 2x10  3# 2x10  3# 2x10  3#     CC ADD       2x10  22# X20  22#     TB front, lateral raise             PT re eval   GS          Pt edu             UBE 2'2' 2'2' 2'2' 2'2' 2'2' 2'2' 2'2' 2'2' 3'3' 2'2'   Ther Activity                                       Gait Training                                       Modalities

## 2024-10-25 ENCOUNTER — OFFICE VISIT (OUTPATIENT)
Dept: PHYSICAL THERAPY | Facility: CLINIC | Age: 54
End: 2024-10-25
Payer: COMMERCIAL

## 2024-10-25 DIAGNOSIS — M19.011 OSTEOARTHRITIS OF RIGHT SHOULDER, UNSPECIFIED OSTEOARTHRITIS TYPE: Primary | ICD-10-CM

## 2024-10-25 DIAGNOSIS — Z96.611 STATUS POST REVERSE TOTAL ARTHROPLASTY OF RIGHT SHOULDER: ICD-10-CM

## 2024-10-25 PROCEDURE — 97110 THERAPEUTIC EXERCISES: CPT

## 2024-10-25 NOTE — PROGRESS NOTES
Daily Note     Today's date: 10/25/2024  Patient name: Jean Elam  : 1970  MRN: [id number]  Referring provider: Thiago Bone MD  Dx:   Encounter Diagnosis     ICD-10-CM    1. Osteoarthritis of right shoulder, unspecified osteoarthritis type  M19.011       2. Status post reverse total arthroplasty of right shoulder  Z96.611           Start Time: 1032  Stop Time: 1122  Total time in clinic (min): 50 minutes    Subjective: Patient reports that he had his follow up with ortho- if forward flexion ROM does not begin to improve with 2-3 more weeks of PT, then patient will get EMG.      Objective: See treatment diary below      Assessment: Patient demonstrates improving R shoulder strength this session.  Patient completed today's session with increase in pain at end of session.  UBE performed in order to improve UE muscular endurance.  Rotator cuff strengthening added despite anatomy of rTSA in attempt to get any additional ROM out of the shoulder.  Added SL ABD, SL horizontal ABD and prone extension.  Future sessions should continue to progress strengthening exercises as able. Tolerated treatment fair. Patient would benefit from continued PT.      Plan: Continue per plan of care.      Precautions: depression, HTN, RA  POC expiration: 24    POC Expires Reeval for Medicare to be completed Unit LImit Auth Expiration Date PT/OT/STVisit Limit   24 By visit  n/a BOMN 24 60 visits                     TREATMENT DIARY  Auth Status DATE 10/7 10/11 10/14 10/18 10/23 10/25   60 visits Used 14 15 16 17 18 19    Remaining 46 45 44 43 42 41       Foto   10/4          Manuals 9/26 9/30 10/4 10/7 10/11 10/14 10/18 10/23 10/25 9/23   R shoulder PROM GS  15 min GS  10 min GS  10 min GS  10 min GS  10 min GS  10 min    GS  10 min   Cupping                                       Neuro Re-Ed             Scap retraction                                                                                           Ther  Ex             Cane ER X20 x20 x20 x20      x20   Cane flexion x20 x20 x20 x20      x20   Elbow flexion X20  5# supinated, hammer X20  7.5#  Supinated, hammer  X20  10# supinated, hammer      X20  5# supinated, hammer   pendulums x20 x20 x20 x20 x20 x20 x20 x20 x20 x20   Pulleys x20 x20 x20       x20   Standing cane ABD  x20           Wall slide unable            Table slide- flex, ABD x20 x20 x20 x20 x20  x20 X20 ea  x20   TB row X20 btb X20  btb X20  plum X20 plum X20 plum X20  SA  CC  22# X20  SA  CC  22# X20  SA  CC  44# X20  SA  CC  44# X20  btb   TB ext X20  btb X20  btb X20  plum X20  plum X20 plum X20  SA  CC  22# X20  SA  CC  22# X20  SA  CC  22# X20  SA  CC  22# X20  btb   TB D2      2x10  Light otb 2x10  Light otb 2x10  Light  otb 2x10  Light otb    SL ABD      X20  2# X20  2# X20  2# X20  3#    SL horizontal ABD      2x10  1# 2x10  2# 2x10  2# 2x10  3#    Prone extension         X20  4#    Wall slide        x10 x10    LPD     X10  22#  X10  44#  SA  44#  x10 SA  44#  2x10 SA  44#  2x10 SA  44#  2x10 SA  55#  2x10    SL AROM             Standing AROM- flex, ABD X20 ea X20 ea 2x10  1# 2x10  1#  2x15  1# 2x10  1#  2x10  1# 2x10 ea 2x10  1# 2x10  1# 2x10  1# X20 ea   TB IR         2x10  gtb    TB ER         X20  gtb    Chest press     2x10  3# 2x10  3# 2x10  3# 2x10  3# 2x10  4#    CC ADD       2x10  22# X20  22# X20  22#    TB front, lateral raise             PT re eval   GS          Pt edu             UBE 2'2' 2'2' 2'2' 2'2' 2'2' 2'2' 2'2' 2'2' 2'2' 2'2'   Ther Activity                                       Gait Training                                       Modalities

## 2024-10-28 ENCOUNTER — OFFICE VISIT (OUTPATIENT)
Dept: PHYSICAL THERAPY | Facility: CLINIC | Age: 54
End: 2024-10-28
Payer: COMMERCIAL

## 2024-10-28 DIAGNOSIS — Z96.611 STATUS POST REVERSE TOTAL ARTHROPLASTY OF RIGHT SHOULDER: ICD-10-CM

## 2024-10-28 DIAGNOSIS — M19.011 OSTEOARTHRITIS OF RIGHT SHOULDER, UNSPECIFIED OSTEOARTHRITIS TYPE: Primary | ICD-10-CM

## 2024-10-28 PROCEDURE — 97032 APPL MODALITY 1+ESTIM EA 15: CPT

## 2024-10-28 PROCEDURE — 97110 THERAPEUTIC EXERCISES: CPT

## 2024-10-28 NOTE — PROGRESS NOTES
Daily Note     Today's date: 10/28/2024  Patient name: Jean Elam  : 1970  MRN: [id number]  Referring provider: Thiago Bone MD  Dx:   Encounter Diagnosis     ICD-10-CM    1. Osteoarthritis of right shoulder, unspecified osteoarthritis type  M19.011       2. Status post reverse total arthroplasty of right shoulder  Z96.611           Start Time: 1115  Stop Time: 1215  Total time in clinic (min): 60 minutes    Subjective: Patient reports that he did purchase some weights to continue with strengthening at home.  He states that he is having more success with wall slides at home.      Objective: See treatment diary below      Assessment: Patient demonstrates improving R shoulder strength this session.  Patient completed today's session without increase in pain.  UBE performed in order to improve UE muscular endurance. Passive stretching performed today due to patient reported stiffness.  Demonstrates ability to perform wall slide without needing to walk fingers to walk up the wall.  TENS to R upper trap to address patient reported tightness.  Future sessions should continue to progress strengthening exercises as able. Tolerated treatment well. Patient would benefit from continued PT.      Plan: Continue per plan of care.      Precautions: depression, HTN, RA  POC expiration: 24    POC Expires Reeval for Medicare to be completed Unit LImit Auth Expiration Date PT/OT/STVisit Limit   24 By visit  n/a BOMN 24 60 visits                     TREATMENT DIARY  Auth Status DATE 10/7 10/11 10/14 10/18 10/23 10/25   60 visits Used 14 15 16 17 18 19    Remaining 46 45 44 43 42 41       Foto   10/4          Manuals 9/26 9/30 10/4 10/7 10/11 10/14 10/18 10/23 10/25 10/28   R shoulder PROM GS  15 min GS  10 min GS  10 min GS  10 min GS  10 min GS  10 min    GS  5 min   Cupping                                       Neuro Re-Ed             Scap retraction                                                                                            Ther Ex             Cane ER X20 x20 x20 x20         Cane flexion x20 x20 x20 x20         Elbow flexion X20  5# supinated, hammer X20  7.5#  Supinated, hammer  X20  10# supinated, hammer         pendulums x20 x20 x20 x20 x20 x20 x20 x20 x20    Pulleys x20 x20 x20          Standing cane ABD  x20           Wall slide unable            Table slide- flex, ABD x20 x20 x20 x20 x20  x20 X20 ea     TB row X20 btb X20  btb X20  plum X20 plum X20 plum X20  SA  CC  22# X20  SA  CC  22# X20  SA  CC  44# X20  SA  CC  44# X20  SA  CC  55#   TB ext X20  btb X20  btb X20  plum X20  plum X20 plum X20  SA  CC  22# X20  SA  CC  22# X20  SA  CC  22# X20  SA  CC  22# X20  SA  CC  22#   TB D2      2x10  Light otb 2x10  Light otb 2x10  Light  otb 2x10  Light otb X20  Light otb   SL ABD      X20  2# X20  2# X20  2# X20  3# X20  4#   SL horizontal ABD      2x10  1# 2x10  2# 2x10  2# 2x10  3# 20  4#   Prone extension         X20  4# X20  5#   Wall slide        x10 x10    LPD     X10  22#  X10  44#  SA  44#  x10 SA  44#  2x10 SA  44#  2x10 SA  44#  2x10 SA  55#  2x10 SA  55#  2x10   SL AROM             Standing AROM- flex, ABD X20 ea X20 ea 2x10  1# 2x10  1#  2x15  1# 2x10  1#  2x10  1# 2x10 ea 2x10  1# 2x10  1# 2x10  1# 2x10  1#   TB IR         2x10  gtb 2x10  gtb   TB ER         X20  gtb 2x10  gtb   Chest press     2x10  3# 2x10  3# 2x10  3# 2x10  3# 2x10  4# X20  5#   CC ADD       2x10  22# X20  22# X20  22# X20  22#   TB front, lateral raise             PT re eval   GS          Pt edu             UBE 2'2' 2'2' 2'2' 2'2' 2'2' 2'2' 2'2' 2'2' 2'2' 2'2'   Ther Activity                                       Gait Training                                       Modalities             TENS          10 min R UT  45 mA                                 Insert SmartText

## 2024-11-01 ENCOUNTER — OFFICE VISIT (OUTPATIENT)
Dept: PHYSICAL THERAPY | Facility: CLINIC | Age: 54
End: 2024-11-01
Payer: COMMERCIAL

## 2024-11-01 DIAGNOSIS — Z96.611 STATUS POST REVERSE TOTAL ARTHROPLASTY OF RIGHT SHOULDER: ICD-10-CM

## 2024-11-01 DIAGNOSIS — M19.011 OSTEOARTHRITIS OF RIGHT SHOULDER, UNSPECIFIED OSTEOARTHRITIS TYPE: Primary | ICD-10-CM

## 2024-11-01 PROCEDURE — 97110 THERAPEUTIC EXERCISES: CPT

## 2024-11-01 NOTE — PROGRESS NOTES
Daily Note     Today's date: 2024  Patient name: Jean Elam  : 1970  MRN: [id number]  Referring provider: Thiago Bone MD  Dx:   Encounter Diagnosis     ICD-10-CM    1. Osteoarthritis of right shoulder, unspecified osteoarthritis type  M19.011       2. Status post reverse total arthroplasty of right shoulder  Z96.611           Start Time: 08  Stop Time: 1000  Total time in clinic (min): 63 minutes    Subjective: Patient reports that his shoulder, especially the upper trap, has been very stiff and tight.  He states that the upper trap has to take over every time he lifts his arm due to the way the shoulder rolls now.      Objective: See treatment diary below      Assessment: Patient demonstrates improving R shoulder strength this session.  Patient completed today's session with increase in pain during wall slides.  UBE performed in order to improve UE muscular endurance.  Improvements in strength are not resulting in improvements in AROM to this point.  Future sessions should continue to progress strengthening exercises as able. Tolerated treatment fair. Patient would benefit from continued PT.      Plan: Continue per plan of care.      Precautions: depression, HTN, RA  Foto completed on: 10/4/24    POC Expires Reeval for Medicare to be completed Unit LImit Auth Expiration Date PT/OT/STVisit Limit   24 By visit  n/a BOMN 24 60 visits                     Auth Status             Approved- visit # 21            Remaining 39            Manuals  9/30 10/4 10/7 10/11 10/14 10/18 10/23 10/25 10/28   R shoulder PROM  GS  10 min GS  10 min GS  10 min GS  10 min GS  10 min    GS  5 min   Cupping                                       Neuro Re-Ed             Scap retraction                                                                                           Ther Ex             Cane ER  x20 x20 x20         Cane flexion  x20 x20 x20         Elbow flexion  X20  7.5#  Supinated, hammer   X20  10# supinated, hammer         pendulums X20 x20 x20 x20 x20 x20 x20 x20 x20    Pulleys  x20 x20          Standing cane ABD  x20           Table slide- flex, ABD X20 ea x20 x20 x20 x20  x20 X20 ea     CC row X20  SA  55# X20  btb X20  plum X20 plum X20 plum X20  SA  CC  22# X20  SA  CC  22# X20  SA  CC  44# X20  SA  CC  44# X20  SA  CC  55#   CC ext X20  SA  22# X20  btb X20  plum X20  plum X20 plum X20  SA  CC  22# X20  SA  CC  22# X20  SA  CC  22# X20  SA  CC  22# X20  SA  CC  22#   TB D2 2x10  Light otb     2x10  Light otb 2x10  Light otb 2x10  Light  otb 2x10  Light otb X20  Light otb   SL ABD 2x10  5#     X20  2# X20  2# X20  2# X20  3# X20  4#   SL horizontal ABD 2x10  5#     2x10  1# 2x10  2# 2x10  2# 2x10  3# 20  4#   Prone flexion 2x10  1#            Prone ABD 2x10  1#            Prone extension         X20  4# X20  5#   Wall slide x10       x10 x10    LPD SA  55#  2x10    X10  22#  X10  44#  SA  44#  x10 SA  44#  2x10 SA  44#  2x10 SA  44#  2x10 SA  55#  2x10 SA  55#  2x10   Standing AROM- flex, ABD 2x10  1# X20 ea 2x10  1# 2x10  1#  2x15  1# 2x10  1#  2x10  1# 2x10 ea 2x10  1# 2x10  1# 2x10  1# 2x10  1#   TB IR 2x10  gtb        2x10  gtb 2x10  gtb   TB ER 2x10  gtb        X20  gtb 2x10  gtb   Chest press 2x10  6#    2x10  3# 2x10  3# 2x10  3# 2x10  3# 2x10  4# X20  5#   CC ADD 2x10  22#      2x10  22# X20  22# X20  22# X20  22#   TB front, lateral raise             PT re eval   GS          Pt edu             UBE 2'2' 2'2' 2'2' 2'2' 2'2' 2'2' 2'2' 2'2' 2'2' 2'2'   Ther Activity                                       Gait Training                                       Modalities             TENS 10 min R UT  45 mA         10 min R UT  45 mA

## 2024-11-04 ENCOUNTER — APPOINTMENT (OUTPATIENT)
Dept: PHYSICAL THERAPY | Facility: CLINIC | Age: 54
End: 2024-11-04
Payer: COMMERCIAL

## 2024-11-05 ENCOUNTER — OFFICE VISIT (OUTPATIENT)
Dept: PHYSICAL THERAPY | Facility: CLINIC | Age: 54
End: 2024-11-05
Payer: COMMERCIAL

## 2024-11-05 DIAGNOSIS — M19.011 OSTEOARTHRITIS OF RIGHT SHOULDER, UNSPECIFIED OSTEOARTHRITIS TYPE: Primary | ICD-10-CM

## 2024-11-05 DIAGNOSIS — Z96.611 STATUS POST REVERSE TOTAL ARTHROPLASTY OF RIGHT SHOULDER: ICD-10-CM

## 2024-11-05 PROCEDURE — 97110 THERAPEUTIC EXERCISES: CPT

## 2024-11-05 NOTE — PROGRESS NOTES
Daily Note     Today's date: 2024  Patient name: Jean Elam  : 1970  MRN: [id number]  Referring provider: Thiago Bone MD  Dx:   Encounter Diagnosis     ICD-10-CM    1. Osteoarthritis of right shoulder, unspecified osteoarthritis type  M19.011       2. Status post reverse total arthroplasty of right shoulder  Z96.611           Start Time: 1527  Stop Time: 1615  Total time in clinic (min): 48 minutes    Subjective: Patient reports that his shoulder is just a little sore after returning to work but overall pain levels are good.  He states that he isn't gaining more motion      Objective: See treatment diary below      Assessment: Patient demonstrates consistent ROM this session.  Patient completed today's session with increase in pain due to muscle burn with exercises challenged to fatigue.  UBE performed in order to improve UE muscular endurance.  Patient continues to feel upper trap tightness that causes discomfort with exercise.  Future sessions should continue to progress strength for AROM as able. Tolerated treatment fair. Patient demonstrated fatigue post treatment and would benefit from continued PT.    Plan: Continue per plan of care.      Precautions: depression, HTN, RA  Foto completed on: 10/4/24    POC Expires Reeval for Medicare to be completed Unit LImit Auth Expiration Date PT/OT/STVisit Limit   24 By visit  n/a BOMN 24 60 visits                     Auth Status            Approved- visit # 21 22           Remaining 39 38           Manuals   10/4 10/7 10/11 10/14 10/18 10/23 10/25 10/28   R shoulder PROM   GS  10 min GS  10 min GS  10 min GS  10 min    GS  5 min   Cupping                                       Neuro Re-Ed             Scap retraction                                                                                           Ther Ex             Cane ER   x20 x20         Cane flexion   x20 x20         Elbow flexion    X20  10# supinated, hammer          pendulums X20 x20 x20 x20 x20 x20 x20 x20 x20    Pulleys   x20          Standing cane ABD             Table slide- flex, ABD X20 ea X20 ea x20 x20 x20  x20 X20 ea     CC row X20  SA  55# X20  SA  55# X20  plum X20 plum X20 plum X20  SA  CC  22# X20  SA  CC  22# X20  SA  CC  44# X20  SA  CC  44# X20  SA  CC  55#   CC ext X20  SA  22# X20  SA  22# X20  plum X20  plum X20 plum X20  SA  CC  22# X20  SA  CC  22# X20  SA  CC  22# X20  SA  CC  22# X20  SA  CC  22#   TB D2 2x10  Light otb 2x10  Light otb    2x10  Light otb 2x10  Light otb 2x10  Light  otb 2x10  Light otb X20  Light otb   SL ABD 2x10  5#     X20  2# X20  2# X20  2# X20  3# X20  4#   SL horizontal ABD 2x10  5# 2x10  6#    2x10  1# 2x10  2# 2x10  2# 2x10  3# 20  4#   Prone flexion 2x10  1#            Prone ABD 2x10  1#            Prone extension         X20  4# X20  5#   Wall slide x10       x10 x10    LPD SA  55#  2x10 SA  55#  2x10   X10  22#  X10  44#  SA  44#  x10 SA  44#  2x10 SA  44#  2x10 SA  44#  2x10 SA  55#  2x10 SA  55#  2x10   Standing AROM- flex, ABD 2x10  1# 2x10  2# 2x10  1# 2x10  1#  2x15  1# 2x10  1#  2x10  1# 2x10 ea 2x10  1# 2x10  1# 2x10  1# 2x10  1#   TB IR 2x10  gtb 2x10  btb       2x10  gtb 2x10  gtb   TB ER 2x10  gtb 2x10  btb       X20  gtb 2x10  gtb   Chest press 2x10  6# 2x10  6#   2x10  3# 2x10  3# 2x10  3# 2x10  3# 2x10  4# X20  5#   CC ADD 2x10  22# 2x10  22#     2x10  22# X20  22# X20  22# X20  22#   TB front, lateral raise             PT re eval   GS          Pt edu             UBE 2'2' 2'2' 2'2' 2'2' 2'2' 2'2' 2'2' 2'2' 2'2' 2'2'   Ther Activity                                       Gait Training                                       Modalities             TENS 10 min R UT  45 mA         10 min R UT  45 mA

## 2024-11-07 ENCOUNTER — OFFICE VISIT (OUTPATIENT)
Dept: PHYSICAL THERAPY | Facility: CLINIC | Age: 54
End: 2024-11-07
Payer: COMMERCIAL

## 2024-11-07 DIAGNOSIS — Z96.611 STATUS POST REVERSE TOTAL ARTHROPLASTY OF RIGHT SHOULDER: ICD-10-CM

## 2024-11-07 DIAGNOSIS — M19.011 OSTEOARTHRITIS OF RIGHT SHOULDER, UNSPECIFIED OSTEOARTHRITIS TYPE: Primary | ICD-10-CM

## 2024-11-07 PROCEDURE — 97110 THERAPEUTIC EXERCISES: CPT

## 2024-11-07 NOTE — PROGRESS NOTES
Daily Note     Today's date: 2024  Patient name: Jean Elam  : 1970  MRN: [id number]  Referring provider: Thiago Bone MD  Dx:   Encounter Diagnosis     ICD-10-CM    1. Osteoarthritis of right shoulder, unspecified osteoarthritis type  M19.011       2. Status post reverse total arthroplasty of right shoulder  Z96.611           Start Time: 1537  Stop Time: 1615  Total time in clinic (min): 38 minutes    Subjective: Patient reports that his shoulder has been good in regards to pain but no change in motion.      Objective: See treatment diary below      Assessment: Patient demonstrates consistent R shoulder strength this session.  Patient completed today's session without increase in pain.  UBE performed in order to improve UE muscular endurance.  Shoulder begins to drift in to ABD once it reaches ~100* of elevation with wall slides.  Introduced NMES in attempt to improve muscle activation for further strength gains.  Future sessions should continue to progress strengthening exercises as able. Tolerated treatment fair. Patient would benefit from continued PT.      Plan: Continue per plan of care.      Precautions: depression, HTN, RA  Foto completed on: 10/4/24    POC Expires Reeval for Medicare to be completed Unit LImit Auth Expiration Date PT/OT/STVisit Limit   24 By visit  n/a BOMN 24 60 visits                     Auth Status           Approved- visit # 21 22 23          Remaining 39 38 37          Manuals    10/7 10/11 10/14 10/18 10/23 10/25 10/28   R shoulder PROM    GS  10 min GS  10 min GS  10 min    GS  5 min   Cupping                                       Neuro Re-Ed             Scap retraction                                                                                           Ther Ex             Cane ER    x20         Cane flexion    x20         Elbow flexion    X20  10# supinated, hammer         pendulums X20 x20 x20 x20 x20 x20 x20 x20 x20    Pulleys              Standing cane ABD             Table slide- flex, ABD X20 ea X20 ea  x20 x20  x20 X20 ea     CC row X20  SA  55# X20  SA  55# X20  SA  55# X20 plum X20 plum X20  SA  CC  22# X20  SA  CC  22# X20  SA  CC  44# X20  SA  CC  44# X20  SA  CC  55#   CC ext X20  SA  22# X20  SA  22# X20  SA  22# X20  plum X20 plum X20  SA  CC  22# X20  SA  CC  22# X20  SA  CC  22# X20  SA  CC  22# X20  SA  CC  22#   TB D2 2x10  Light otb 2x10  Light otb 2x10  Light otb   2x10  Light otb 2x10  Light otb 2x10  Light  otb 2x10  Light otb X20  Light otb   SL ABD 2x10  5#     X20  2# X20  2# X20  2# X20  3# X20  4#   SL horizontal ABD 2x10  5# 2x10  6# 2x10  6#   2x10  1# 2x10  2# 2x10  2# 2x10  3# 20  4#   Prone flexion 2x10  1#            Prone ABD 2x10  1#            Prone extension         X20  4# X20  5#   Wall slide x10       x10 x10    LPD SA  55#  2x10 SA  55#  2x10 SA  55#  2x10  X10  22#  X10  44#  SA  44#  x10 SA  44#  2x10 SA  44#  2x10 SA  44#  2x10 SA  55#  2x10 SA  55#  2x10   Standing AROM- flex, ABD 2x10  1# 2x10  2# NMES  5:5  35 mA  8' 2x10  1#  2x15  1# 2x10  1#  2x10  1# 2x10 ea 2x10  1# 2x10  1# 2x10  1# 2x10  1#   TB IR 2x10  gtb 2x10  btb 2x10  plum      2x10  gtb 2x10  gtb   TB ER 2x10  gtb 2x10  btb 2x10  plum      X20  gtb 2x10  gtb   Chest press 2x10  6# 2x10  6# 2x10  7#  2x10  3# 2x10  3# 2x10  3# 2x10  3# 2x10  4# X20  5#   CC ADD 2x10  22# 2x10  22# 2x10  22#    2x10  22# X20  22# X20  22# X20  22#   TB front, lateral raise             PT re eval             Pt edu             UBE 2'2' 2'2' 2'2' 2'2' 2'2' 2'2' 2'2' 2'2' 2'2' 2'2'   Ther Activity                                       Gait Training                                       Modalities             TENS 10 min R UT  45 mA         10 min R UT  45 mA

## 2024-11-08 ENCOUNTER — APPOINTMENT (OUTPATIENT)
Dept: PHYSICAL THERAPY | Facility: CLINIC | Age: 54
End: 2024-11-08
Payer: COMMERCIAL

## 2024-11-11 ENCOUNTER — APPOINTMENT (OUTPATIENT)
Dept: PHYSICAL THERAPY | Facility: CLINIC | Age: 54
End: 2024-11-11
Payer: COMMERCIAL

## 2024-11-12 ENCOUNTER — OFFICE VISIT (OUTPATIENT)
Dept: PHYSICAL THERAPY | Facility: CLINIC | Age: 54
End: 2024-11-12
Payer: COMMERCIAL

## 2024-11-12 DIAGNOSIS — M19.011 OSTEOARTHRITIS OF RIGHT SHOULDER, UNSPECIFIED OSTEOARTHRITIS TYPE: Primary | ICD-10-CM

## 2024-11-12 DIAGNOSIS — Z96.611 STATUS POST REVERSE TOTAL ARTHROPLASTY OF RIGHT SHOULDER: ICD-10-CM

## 2024-11-12 PROCEDURE — 97110 THERAPEUTIC EXERCISES: CPT

## 2024-11-12 NOTE — PROGRESS NOTES
Daily Note     Today's date: 2024  Patient name: Jean Elam  : 1970  MRN: [id number]  Referring provider: Thiago Bone MD  Dx:   Encounter Diagnosis     ICD-10-CM    1. Osteoarthritis of right shoulder, unspecified osteoarthritis type  M19.011       2. Status post reverse total arthroplasty of right shoulder  Z96.611           Start Time: 1530  Stop Time: 1625  Total time in clinic (min): 55 minutes    Subjective: Patient reports that his shoulder strength is getting stronger in all planes except for shoulder flexion.  No noticeable improvement in flexion ROM.      Objective: See treatment diary below      Assessment: Patient demonstrates consistent R shoulder flexion ROM and improving ABD strength this session.  Patient completed today's session without increase in pain.  UBE performed in order to improve UE muscular endurance.  Encouraged patient to contact surgeon's office about EMG per their recommendation as his shoulder flexion ROM is not returning as expected.  Future sessions should continue to progress strengthening exercises as able. Tolerated treatment well. Patient would benefit from continued PT.      Plan: Continue per plan of care.      Precautions: depression, HTN, RA  Foto completed on: 10/4/24    POC Expires Reeval for Medicare to be completed Unit LImit Auth Expiration Date PT/OT/STVisit Limit   24 By visit  n/a BOMN 24 60 visits                     Auth Status          Approved- visit # 21 22 23 24         Remaining 39 38 37 36         Manuals     10/11 10/14 10/18 10/23 10/25 10/28   R shoulder PROM     GS  10 min GS  10 min    GS  5 min   Cupping                                       Neuro Re-Ed             Scap retraction                                                                                           Ther Ex             Cane ER             Cane flexion             Elbow flexion             pendulums X20 x20 x20 x20 x20 x20 x20 x20  x20    Pulleys             Standing cane ABD             Table slide- flex, ABD X20 ea X20 ea   x20  x20 X20 ea     CC row X20  SA  55# X20  SA  55# X20  SA  55# X20  SA  55# X20 plum X20  SA  CC  22# X20  SA  CC  22# X20  SA  CC  44# X20  SA  CC  44# X20  SA  CC  55#   CC ext X20  SA  22# X20  SA  22# X20  SA  22# X20  SA  22# X20 plum X20  SA  CC  22# X20  SA  CC  22# X20  SA  CC  22# X20  SA  CC  22# X20  SA  CC  22#   TB D2 2x10  Light otb 2x10  Light otb 2x10  Light otb   2x10  Light otb 2x10  Light otb 2x10  Light  otb 2x10  Light otb X20  Light otb   SL ABD 2x10  5#   2x10  6#  X20  2# X20  2# X20  2# X20  3# X20  4#   SL horizontal ABD 2x10  5# 2x10  6# 2x10  6# 2x10  6#  2x10  1# 2x10  2# 2x10  2# 2x10  3# 20  4#   Prone flexion 2x10  1#            Prone ABD 2x10  1#            Prone extension         X20  4# X20  5#   Wall slide x10       x10 x10    LPD SA  55#  2x10 SA  55#  2x10 SA  55#  2x10 SA  55#  2x10 X10  22#  X10  44#  SA  44#  x10 SA  44#  2x10 SA  44#  2x10 SA  44#  2x10 SA  55#  2x10 SA  55#  2x10   Standing AROM- flex, ABD 2x10  1# 2x10  2# NMES  5:5  35 mA  8' NMES  5:5  35 mA  8' 2x10  1#  2x10  1# 2x10 ea 2x10  1# 2x10  1# 2x10  1# 2x10  1#   TB IR 2x10  gtb 2x10  btb 2x10  plum      2x10  gtb 2x10  gtb   TB ER 2x10  gtb 2x10  btb 2x10  plum      X20  gtb 2x10  gtb   Chest press 2x10  6# 2x10  6# 2x10  7# 2x10  8# 2x10  3# 2x10  3# 2x10  3# 2x10  3# 2x10  4# X20  5#   CC ADD 2x10  22# 2x10  22# 2x10  22# 2x10  22#   2x10  22# X20  22# X20  22# X20  22#   TB front, lateral raise             PT re eval             Pt edu             UBE 2'2' 2'2' 2'2' 2'2' 2'2' 2'2' 2'2' 2'2' 2'2' 2'2'   Ther Activity                                       Gait Training                                       Modalities             TENS 10 min R UT  45 mA   10 min R UT 45 mA      10 min R UT  45 mA

## 2024-11-14 ENCOUNTER — OFFICE VISIT (OUTPATIENT)
Dept: PHYSICAL THERAPY | Facility: CLINIC | Age: 54
End: 2024-11-14
Payer: COMMERCIAL

## 2024-11-14 DIAGNOSIS — Z96.611 STATUS POST REVERSE TOTAL ARTHROPLASTY OF RIGHT SHOULDER: ICD-10-CM

## 2024-11-14 DIAGNOSIS — M19.011 OSTEOARTHRITIS OF RIGHT SHOULDER, UNSPECIFIED OSTEOARTHRITIS TYPE: Primary | ICD-10-CM

## 2024-11-14 PROCEDURE — 97110 THERAPEUTIC EXERCISES: CPT

## 2024-11-14 PROCEDURE — 97032 APPL MODALITY 1+ESTIM EA 15: CPT

## 2024-11-14 NOTE — PROGRESS NOTES
Daily Note     Today's date: 2024  Patient name: Jean Elam  : 1970  MRN: [id number]  Referring provider: Thiago Bone MD  Dx:   Encounter Diagnosis     ICD-10-CM    1. Osteoarthritis of right shoulder, unspecified osteoarthritis type  M19.011       2. Status post reverse total arthroplasty of right shoulder  Z96.611           Start Time: 1530  Stop Time: 1625  Total time in clinic (min): 55 minutes    Subjective: Patient reports that he was able to contact surgeon's office about scheduling EMG and they will call back.  He states that his shoulder was very sore after last session to the point that he put ice on it.      Objective: See treatment diary below      Assessment: Patient demonstrates improving R shoulder strength this session.  Patient completed today's session with increase in pain during exercise performance.  UBE performed in order to improve UE muscular endurance.  Encouraged patient to follow through with surgeon about EMG.  Future sessions next week should transition patient to [facility] and follow up with surgeon. Tolerated treatment well. Patient would benefit from continued PT.      Plan: Continue per plan of care.      Precautions: depression, HTN, RA  Foto completed on: 10/4/24    POC Expires Reeval for Medicare to be completed Unit LImit Auth Expiration Date PT/OT/STVisit Limit   24 By visit  n/a BOMN 24 60 visits                     Auth Status         Approved- visit # 21 22 23 24 25        Remaining 39 38 37 36 35        Manuals      10/14 10/18 10/23 10/25 10/28   R shoulder PROM     GS  8 min GS  10 min    GS  5 min   Cupping                                       Neuro Re-Ed             Scap retraction                                                                                           Ther Ex             Cane ER             Cane flexion             Elbow flexion             pendulums X20 x20 x20 x20  x20 x20 x20 x20    Pulleys              Standing cane ABD             Table slide- flex, ABD X20 ea X20 ea     x20 X20 ea     CC row X20  SA  55# X20  SA  55# X20  SA  55# X20  SA  55# X20  SA  66# X20  SA  CC  22# X20  SA  CC  22# X20  SA  CC  44# X20  SA  CC  44# X20  SA  CC  55#   CC ext X20  SA  22# X20  SA  22# X20  SA  22# X20  SA  22# X20  SA  44# X20  SA  CC  22# X20  SA  CC  22# X20  SA  CC  22# X20  SA  CC  22# X20  SA  CC  22#   TB D2 2x10  Light otb 2x10  Light otb 2x10  Light otb  2x10  gtb 2x10  Light otb 2x10  Light otb 2x10  Light  otb 2x10  Light otb X20  Light otb   SL ABD 2x10  5#   2x10  6# 2x10  6# X20  2# X20  2# X20  2# X20  3# X20  4#   SL horizontal ABD 2x10  5# 2x10  6# 2x10  6# 2x10  6# 2x10  6# 2x10  1# 2x10  2# 2x10  2# 2x10  3# 20  4#   Prone flexion 2x10  1#            Prone ABD 2x10  1#            Prone extension         X20  4# X20  5#   Wall slide x10       x10 x10    LPD SA  55#  2x10 SA  55#  2x10 SA  55#  2x10 SA  55#  2x10 SA  66#  2x10 SA  44#  2x10 SA  44#  2x10 SA  44#  2x10 SA  55#  2x10 SA  55#  2x10   Standing AROM- flex, ABD 2x10  1# 2x10  2# NMES  5:5  35 mA  8' NMES  5:5  35 mA  8'  2x10 ea 2x10  1# 2x10  1# 2x10  1# 2x10  1#   TB IR 2x10  gtb 2x10  btb 2x10  plum  X20  plum    2x10  gtb 2x10  gtb   TB ER 2x10  gtb 2x10  btb 2x10  plum  X20  plum    X20  gtb 2x10  gtb   Chest press 2x10  6# 2x10  6# 2x10  7# 2x10  8# 2x10  10# 2x10  3# 2x10  3# 2x10  3# 2x10  4# X20  5#   CC ADD 2x10  22# 2x10  22# 2x10  22# 2x10  22# X30  22#  X10  44#  2x10  22# X20  22# X20  22# X20  22#   TB front, lateral raise             PT re eval             Pt edu             UBE 2'2' 2'2' 2'2' 2'2' 2'2' 2'2' 2'2' 2'2' 2'2' 2'2'   Ther Activity                                       Gait Training                                       Modalities             TENS 10 min R UT  45 mA   10 min R UT 45 mA 10 min R UT  45 mA     10 min R UT  45 mA

## 2024-11-15 ENCOUNTER — APPOINTMENT (OUTPATIENT)
Dept: PHYSICAL THERAPY | Facility: CLINIC | Age: 54
End: 2024-11-15
Payer: COMMERCIAL

## 2024-11-19 ENCOUNTER — APPOINTMENT (OUTPATIENT)
Dept: PHYSICAL THERAPY | Facility: CLINIC | Age: 54
End: 2024-11-19
Payer: COMMERCIAL

## 2024-11-21 ENCOUNTER — EVALUATION (OUTPATIENT)
Dept: PHYSICAL THERAPY | Facility: CLINIC | Age: 54
End: 2024-11-21
Payer: COMMERCIAL

## 2024-11-21 DIAGNOSIS — M19.011 OSTEOARTHRITIS OF RIGHT SHOULDER, UNSPECIFIED OSTEOARTHRITIS TYPE: Primary | ICD-10-CM

## 2024-11-21 DIAGNOSIS — Z96.611 STATUS POST REVERSE TOTAL ARTHROPLASTY OF RIGHT SHOULDER: ICD-10-CM

## 2024-11-21 PROCEDURE — 97110 THERAPEUTIC EXERCISES: CPT

## 2024-11-21 NOTE — PROGRESS NOTES
PT Discharge    Today's date: 2024  Patient name: Jean Elam  : 1970  MRN: [id number]  Referring provider: Thiago Bone MD  Dx:   Encounter Diagnosis     ICD-10-CM    1. Osteoarthritis of right shoulder, unspecified osteoarthritis type  M19.011       2. Status post reverse total arthroplasty of right shoulder  Z96.611           Start Time: 1530  Stop Time: 1618  Total time in clinic (min): 48 minutes    Assessment    Assessment details: Jean Elam is a pleasant 54 y.o. male who presents s/p R rTSA.  Movement diagnosis of R shoulder hypomobility with primary nociceptive pain.  With PT, he has made significant improvements in R shoulder PROM but AROM in standing remains limited.  Patient has not made significant improvements in AROM over the last month.  He has EMG scheduled next week for assessment of RUE nerves.  Functionally, he has been able to return to some ADLs such as dressing and bathing, but he is still limited in any overhead activity and lifting more than 2#.  No further improvements expected with skilled PT at this time.  Patient has demonstrated independence with HEP.  I encouraged patient to keep up with HEP for maintenance at this time.  Encouraged patient to contact me with any questions or concerns in the future.    Goals  STGs  Patient will be independent with HEP in 4 weeks -met  Patient will decrease pain by 2 points in 4 weeks -met  LTGs  Patient will achieve 120* R shoulder flexion AROM in 12 weeks -not met  Patient will achieve 110* R shoulder ABD AROM in 12 weeks -not met  Patient will be able to sleep through the night without limitation in 12 weeks -met    Plan    Treatment plan discussed with: patient  Plan details: D/c to HEP        Subjective Evaluation    History of Present Illness  Date of surgery: 2024  Mechanism of injury: Pain location: R shoulder-anterior and posterior aspects  Pain severity: 1-0-6  Aggs: sleeping, movement  Eases: ice,  "rest  Irritability: high  REGINALD/timeline: patient has complex history of R shoulder pain and limited mobility, about 7 total surgeries on it, developed RA 20 years old which exacerbated  symptoms, shoulder \"froze\" after 3rd surgery resulting in limit range for many years, 17 years ago patient had R TSA which was good for about 15 years, a year ago he started waking up with discomfort and symptoms worsened, rTSA performed on 26  Red flags: tingling in 4th and 5th digits  PMH/PSH: see above for complex surgical history  L Hand Dominant    10/4- patient states that he feels 150% better in terms of pain since surgery, but not even 50% of his function has returned    - patient still reports 100% improvement in pain and 50% of his function has returned  Patient Goals  Patient goals for therapy: decreased pain  Patient goal: good night sleep  Pain  Current pain ratin  At best pain ratin  At worst pain ratin          Objective     Postural Observations  Seated posture: fair    Additional Postural Observation Details  Forward head, rounded shoulders    Cervical/Thoracic Screen   Cervical range of motion within normal limits with the following exceptions: Moderate limitation into cervical extension, all other motions WNL    Neurological Testing     Sensation     Shoulder   Left Shoulder   Intact: light touch    Right Shoulder   Intact: Light touch    Reflexes   Left   Biceps (C5/C6): normal (2+)  Brachioradialis (C6): normal (2+)  Triceps (C7): normal (2+)    Right   Biceps (C5/C6): normal (2+)  Brachioradialis (C6): normal (2+)    Active Range of Motion   Left Shoulder   Normal active range of motion    Right Shoulder   Flexion: 82 degrees   Abduction: 89 degrees     Additional Active Range of Motion Details  Supine  R shoulder flexion AROM=133*  R shoulder ABD AROM=99*    Passive Range of Motion     Right Shoulder   Flexion: 153 degrees   Abduction: 115 degrees   External rotation 0°: 55 degrees "     Strength/Myotome Testing     Right Shoulder     Planes of Motion   Flexion: 3-   Abduction: 3-       Flowsheet Rows      Flowsheet Row Most Recent Value   PT/OT G-Codes    Current Score 32   Projected Score 58               Precautions: depression, HTN, RA  Foto completed on: 10/4/24    POC Expires Reeval for Medicare to be completed Unit LImit Auth Expiration Date PT/OT/STVisit Limit   11/29/24 By visit  n/a BOMN 12/31/24 60 visits                     Auth Status 11/1 11/5 11/7 11/12 11/14 11/21       Approved- visit # 21 22 23 24 25 26       Remaining 39 38 37 36 35 34       Manuals       10/18 10/23 10/25 10/28   R shoulder PROM     GS  8 min     GS  5 min   Cupping                                       Neuro Re-Ed             Scap retraction                                                                                           Ther Ex             Cane ER             Cane flexion             Elbow flexion             pendulums X20 x20 x20 x20   x20 x20 x20    Pulleys             Standing cane ABD             Table slide- flex, ABD X20 ea X20 ea     x20 X20 ea     CC row X20  SA  55# X20  SA  55# X20  SA  55# X20  SA  55# X20  SA  66# X20  SA  66# X20  SA  CC  22# X20  SA  CC  44# X20  SA  CC  44# X20  SA  CC  55#   CC ext X20  SA  22# X20  SA  22# X20  SA  22# X20  SA  22# X20  SA  44# X20  SA  44# X20  SA  CC  22# X20  SA  CC  22# X20  SA  CC  22# X20  SA  CC  22#   TB D2 2x10  Light otb 2x10  Light otb 2x10  Light otb  2x10  gtb 2x10  otb 2x10  Light otb 2x10  Light  otb 2x10  Light otb X20  Light otb   SL ABD 2x10  5#   2x10  6# 2x10  6#  X20  2# X20  2# X20  3# X20  4#   SL horizontal ABD 2x10  5# 2x10  6# 2x10  6# 2x10  6# 2x10  6#  2x10  2# 2x10  2# 2x10  3# 20  4#   Prone flexion 2x10  1#            Prone ABD 2x10  1#            Prone extension         X20  4# X20  5#   Wall slide x10       x10 x10    LPD SA  55#  2x10 SA  55#  2x10 SA  55#  2x10 SA  55#  2x10 SA  66#  2x10 SA  66#  2x10  SA  44#  2x10 SA  44#  2x10 SA  55#  2x10 SA  55#  2x10   Standing AROM- flex, ABD 2x10  1# 2x10  2# NMES  5:5  35 mA  8' NMES  5:5  35 mA  8'   2x10  1# 2x10  1# 2x10  1# 2x10  1#   TB IR 2x10  gtb 2x10  btb 2x10  plum  X20  plum X20 plum   2x10  gtb 2x10  gtb   TB ER 2x10  gtb 2x10  btb 2x10  plum  X20  plum X20 plum   X20  gtb 2x10  gtb   Chest press 2x10  6# 2x10  6# 2x10  7# 2x10  8# 2x10  10#  2x10  3# 2x10  3# 2x10  4# X20  5#   CC ADD 2x10  22# 2x10  22# 2x10  22# 2x10  22# X30  22#  X10  44# 2x10  44# 2x10  22# X20  22# X20  22# X20  22#   TB front, lateral raise             PT re eval      GS       Pt edu             UBE 2'2' 2'2' 2'2' 2'2' 2'2' 2'2' 2'2' 2'2' 2'2' 2'2'   Ther Activity                                       Gait Training                                       Modalities             TENS 10 min R UT  45 mA   10 min R UT 45 mA 10 min R UT  45 mA     10 min R UT  45 mA

## 2024-12-06 ENCOUNTER — TRANSCRIBE ORDERS (OUTPATIENT)
Dept: SCHEDULING | Age: 54
End: 2024-12-06

## 2024-12-06 DIAGNOSIS — G56.91 UNSPECIFIED MONONEUROPATHY OF RIGHT UPPER LIMB: ICD-10-CM

## 2024-12-06 DIAGNOSIS — Z96.611 PRESENCE OF RIGHT ARTIFICIAL SHOULDER JOINT: ICD-10-CM

## 2024-12-06 DIAGNOSIS — T84.84XA PAIN DUE TO INTERNAL ORTHOPEDIC PROSTHETIC DEVICES, IMPLANTS AND GRAFTS, INITIAL ENCOUNTER (CMS/HCC): Primary | ICD-10-CM

## 2024-12-23 ENCOUNTER — HOSPITAL ENCOUNTER (OUTPATIENT)
Dept: RADIOLOGY | Facility: HOSPITAL | Age: 54
Discharge: HOME | End: 2024-12-23
Attending: ORTHOPAEDIC SURGERY
Payer: COMMERCIAL

## 2024-12-23 DIAGNOSIS — T84.84XA PAIN DUE TO INTERNAL ORTHOPEDIC PROSTHETIC DEVICES, IMPLANTS AND GRAFTS, INITIAL ENCOUNTER (CMS/HCC): ICD-10-CM

## 2024-12-23 DIAGNOSIS — G56.91 UNSPECIFIED MONONEUROPATHY OF RIGHT UPPER LIMB: ICD-10-CM

## 2024-12-23 DIAGNOSIS — Z96.611 PRESENCE OF RIGHT ARTIFICIAL SHOULDER JOINT: ICD-10-CM

## 2024-12-23 PROCEDURE — 73200 CT UPPER EXTREMITY W/O DYE: CPT | Mod: RT

## 2024-12-23 PROCEDURE — 76377 3D RENDER W/INTRP POSTPROCES: CPT

## (undated) DEVICE — SUTURE POLYSORB 2-0 UNDYED 1X30 GS-10

## (undated) DEVICE — MIX-EVAC HIGH VACUUM KIT

## (undated) DEVICE — SOLN IRRIG .9%SOD 3L

## (undated) DEVICE — GLOVE SZ 8 LINER PROTEXIS PI BL

## (undated) DEVICE — SOLN IRRIG STER WATER 1000ML

## (undated) DEVICE — TUBING SMOKE EVAC PENCIL COATED

## (undated) DEVICE — SLEEVE SCD COMFORT KNEE LENGTH MED

## (undated) DEVICE — DRAPE POUCH IRRIGATION

## (undated) DEVICE — TIP COAXIAL FEMORAL CANAL

## (undated) DEVICE — HOOD T7 PLUS

## (undated) DEVICE — SHEET DRAPE 3/4 REVERSEFOLD 53X77

## (undated) DEVICE — DRAPE STERI TOWEL PLASTIC 18X24

## (undated) DEVICE — BULB SYRINGE IRRIGATION STERILE

## (undated) DEVICE — SET HANDPIECE INTERPULSE

## (undated) DEVICE — DRAPE-U-1015

## (undated) DEVICE — STAPLER SKIN APPOSE

## (undated) DEVICE — DRESSING AQUACEL AG 14IN

## (undated) DEVICE — DRAPE IOBAN

## (undated) DEVICE — NEEDLE SPINAL 18G X3-1-2IN

## (undated) DEVICE — TIP SUCTION YANKAUER

## (undated) DEVICE — MANIFOLD FOUR PORT NEPTUNE

## (undated) DEVICE — PAD GROUND ELECTROSURGICAL W/CORD

## (undated) DEVICE — PACK MLHS SHOULDER PACK RFID STDZ

## (undated) DEVICE — FACEMASK FOR SHOULDER POSITONER

## (undated) DEVICE — TIP SUCTION FRAZIER 12FR

## (undated) DEVICE — BLANKET UPPER BODY BAIR HUGGER

## (undated) DEVICE — SUTURE MONOCRYL 3-0  Y497G

## (undated) DEVICE — DRESSING SPONGE ALL GAUZE 4X4 STER 10PK

## (undated) DEVICE — MAT ABSORBANT SUPERMAT 50 X 56

## (undated) DEVICE — BLADE SCALPEL #15

## (undated) DEVICE — GUIDE WIRE 2.5 X220MM PERFORM+ AEQUALIS

## (undated) DEVICE — GLOVE 8 PROTEXIS PI MICRO

## (undated) DEVICE — ADHESIVE SKIN DERMABOND ADVANCED 0.7ML

## (undated) DEVICE — TOOMEY SYRINGE, 70CC STERILE

## (undated) DEVICE — DRESSING ABD STER LGE 8X10

## (undated) DEVICE — TOWEL SURGICAL W17XL27IN BLUE COTTON STANDARD PREWASHED DELI

## (undated) DEVICE — VEST STERILE

## (undated) DEVICE — NEEDLE HALF CIRCLE TAPER MED GALLES FASCIA

## (undated) DEVICE — DRESSING TEGADERM 4X4 3/4

## (undated) DEVICE — BLADE SAGITTAL #376 HEAVY WIDE

## (undated) DEVICE — COVER LIGHTHANDLE (STERILE SINGLE PA

## (undated) DEVICE — APPLICATOR CHLORAPREP 26ML ORANGE TINT

## (undated) DEVICE — SPONGE LAP 18X18 SAFE-T RFID ENHANCED XRAY

## (undated) DEVICE — DRAPE TOWEL 17X23 NON-STERILE

## (undated) DEVICE — SUTURE TICRON 5 BLUE 5X30 KV-37

## (undated) DEVICE — NOZZLE FLEXIBLE THIN

## (undated) DEVICE — SUTURE TICRON 5 BLUE 5X30 SCC

## (undated) DEVICE — GOWN SIRUS FABRNF RAGLAN XL ST 28/CS

## (undated) DEVICE — QEQUALIS PERFORM REVERSED PERIPHEAL

## (undated) DEVICE — SUTURETAPE 1.3MM SUTURE W NEEDLE

## (undated) DEVICE — DRAPE 3/4 REINFORCED

## (undated) DEVICE — SURGICEL 2X14